# Patient Record
Sex: FEMALE | Race: WHITE | NOT HISPANIC OR LATINO | Employment: OTHER | URBAN - METROPOLITAN AREA
[De-identification: names, ages, dates, MRNs, and addresses within clinical notes are randomized per-mention and may not be internally consistent; named-entity substitution may affect disease eponyms.]

---

## 2017-02-03 LAB
HBV SURFACE AG SERPL QL IA: NONREACTIVE
HIV 1+2 AB+HIV1 P24 AG SERPL QL IA: NONREACTIVE
RUBELLA ABY IGG: NORMAL
T PALLIDUM IGG SER QL: NORMAL

## 2017-08-11 LAB — GROUP B STREP PCR: NEGATIVE

## 2017-09-01 ENCOUNTER — HOSPITAL ENCOUNTER (OUTPATIENT)
Facility: CLINIC | Age: 27
Discharge: HOME | End: 2017-09-01
Attending: OBSTETRICS & GYNECOLOGY | Admitting: OBSTETRICS & GYNECOLOGY
Payer: COMMERCIAL

## 2017-09-01 VITALS
BODY MASS INDEX: 40.97 KG/M2 | HEIGHT: 64 IN | DIASTOLIC BLOOD PRESSURE: 56 MMHG | SYSTOLIC BLOOD PRESSURE: 108 MMHG | WEIGHT: 240 LBS | TEMPERATURE: 98.1 F | RESPIRATION RATE: 18 BRPM

## 2017-09-01 LAB
ALBUMIN UR-MCNC: NEGATIVE MG/DL
APPEARANCE UR: ABNORMAL
BACTERIA #/AREA URNS HPF: ABNORMAL /HPF
BILIRUB UR QL STRIP: NEGATIVE
COLOR UR AUTO: YELLOW
GLUCOSE UR STRIP-MCNC: NEGATIVE MG/DL
HGB UR QL STRIP: NEGATIVE
HYALINE CASTS #/AREA URNS LPF: 1 /LPF (ref 0–2)
KETONES UR STRIP-MCNC: NEGATIVE MG/DL
LEUKOCYTE ESTERASE UR QL STRIP: ABNORMAL
MUCOUS THREADS #/AREA URNS LPF: PRESENT /LPF
NITRATE UR QL: NEGATIVE
PH UR STRIP: 6 PH (ref 5–7)
RBC #/AREA URNS AUTO: 1 /HPF (ref 0–2)
SOURCE: ABNORMAL
SP GR UR STRIP: 1.02 (ref 1–1.03)
SQUAMOUS #/AREA URNS AUTO: 2 /HPF (ref 0–1)
UROBILINOGEN UR STRIP-MCNC: 4 MG/DL (ref 0–2)
WBC #/AREA URNS AUTO: 5 /HPF (ref 0–2)

## 2017-09-01 PROCEDURE — 81001 URINALYSIS AUTO W/SCOPE: CPT | Performed by: OBSTETRICS & GYNECOLOGY

## 2017-09-01 PROCEDURE — 59025 FETAL NON-STRESS TEST: CPT

## 2017-09-01 PROCEDURE — 99214 OFFICE O/P EST MOD 30 MIN: CPT | Mod: 25

## 2017-09-01 RX ORDER — ONDANSETRON 2 MG/ML
4 INJECTION INTRAMUSCULAR; INTRAVENOUS EVERY 6 HOURS PRN
Status: DISCONTINUED | OUTPATIENT
Start: 2017-09-01 | End: 2017-09-01 | Stop reason: HOSPADM

## 2017-09-01 NOTE — PROVIDER NOTIFICATION
"   09/01/17 1522   Provider Notification   Provider Name/Title    Method of Notification Phone   Request Evaluate - Remote   Notification Reason Labor Status;Status Update;SVE     Dr. Camacho updated on no cervical change noted since last exam, contractions spaced apart and patient reports these contractions are \"more tolerable\" to deal with than earlier. Orders for discharge received and give patient prescription for Macrobid 100 mg PO BID x 7 days and one dose of Vistaril 50 mg PO.  "

## 2017-09-01 NOTE — PROVIDER NOTIFICATION
09/01/17 1330   Provider Notification   Provider Name/Title Dr. Camacho   Method of Notification In Department   Notification Reason Status Update     Dr. Camacho reviewed UA results and made aware of current SVE. Orders to have patient ambulate again and recheck in an hour.

## 2017-09-01 NOTE — PROVIDER NOTIFICATION
09/01/17 1214   Provider Notification   Provider Name/Title    Method of Notification In Department   Request Evaluate - Remote   Notification Reason Patient Arrived;Membrane Status;Labor Status;SVE     Dr. Camacho notified of patient's arrival and current status, awaiting results from UA.

## 2017-09-01 NOTE — IP AVS SNAPSHOT
[facility] Labor and Delivery    [address]    [facility] [address]    Phone:  [phone]    Fax:  [phone]                                       After Visit Summary   9/1/2017    Rocío Castle    MRN: [id number]           After Visit Summary Signature Page     I have received my discharge instructions, and my questions have been answered. I have discussed any challenges I see with this plan with the nurse or doctor.    ..........................................................................................................................................  Patient/Patient Representative Signature      ..........................................................................................................................................  Patient Representative Print Name and Relationship to Patient    ..................................................               ................................................  Date                                            Time    ..........................................................................................................................................  Reviewed by Signature/Title    ...................................................              ..............................................  Date                                                            Time

## 2017-09-01 NOTE — IP AVS SNAPSHOT
MRN:[id number]                      After Visit Summary   9/1/2017    Rocío Castle    MRN: [id number]           Thank you!     Thank you for choosing [facility] for your care. Our goal is always to provide you with excellent care. Hearing back from our patients is one way we can continue to improve our services. Please take a few minutes to complete the written survey that you may receive in the mail after you visit. If you would like to speak to someone directly about your visit please contact Patient Relations at [phone]. Thank you!          Patient Information     Date Of Birth          1990        About your hospital stay     You were admitted on:  September 1, 2017 You last received care in the:  [facility] Labor and Delivery    You were discharged on:  September 1, 2017       Who to Call     For medical emergencies, please call 911.  For non-urgent questions about your medical care, please call your primary care provider or clinic, [phone]          Attending Provider     Provider Specialty    Nuha Camacho, DO OB/Gyn       Primary Care Provider Office Phone # Fax #    Marleen Pop [phone]      Further instructions from your care team       Discharge Instruction for Undelivered Patients      You were seen for: Labor Assessment and aware of concentrated urine  We Consulted: Dr. Camacho  You had (Test or Medicine):External fetal monitoring, non-stress test which was noted to be reactive,     Diet:   Regular Diet     Activity:  {Activity:7025384} No restrictions    Call your provider if you notice:  Swelling in your face or increased swelling in your hands or legs.  Headaches that are not relieved by Tylenol (acetaminophen).  Changes in your vision (blurring: seeing spots or stars.)  Nausea (sick to your stomach) and vomiting (throwing up).   Weight gain of 5 pounds or more per week.  Heartburn that doesn't go away.  Signs of  "bladder infection: pain when you urinate (use the toilet), need to go more often and more urgently.  The bag of ravi (rupture of membranes) breaks, or you notice leaking in your underwear.  Bright red blood in your underwear.  Abdominal (lower belly) or stomach pain.  For first baby: Contractions (tightening) less than 5 minutes apart for one hour or more.  Second (plus) baby: Contractions (tightening) less than 10 minutes apart and getting stronger.  *If less than 34 weeks: Contractions (tightenings) more than 6 times in one hour.  Increase or change in vaginal discharge (note the color and amount)  Other: ***    Follow-up:  As scheduled in the clinic          Pending Results     No orders found from 2017 to 2017.            Admission Information     Date & Time Provider Department Dept. Phone    2017 Nuha Camacho,  [facility] Labor and Delivery [phone]      Your Vitals Were     Blood Pressure Temperature Respirations Height Weight BMI (Body Mass Index)    115/74 98.1  F (36.7  C) (Oral) 18 1.626 m (5' 4\") 108.9 kg (240 lb) 41.2 kg/m2      MyChart Information     Dotflux lets you send messages to your doctor, view your test results, renew your prescriptions, schedule appointments and more. To sign up, go to [email or web address] . Click on \"Log in\" on the left side of the screen, which will take you to the Welcome page. Then click on \"Sign up Now\" on the right side of the page.     You will be asked to enter the access code listed below, as well as some personal information. Please follow the directions to create your username and password.     Your access code is: [email or web address]  Expires: 2017  3:33 PM     Your access code will  in 90 days. If you need help or a new code, please call your [place] clinic or [phone].        Care EveryWhere ID     This is your Care EveryWhere ID. This could be used by other organizations to access your [place] medical " records  [id number]        Equal Access to Services     [facility] CenterCARLOS : Hadii amanda galloway lakisha Soalexaali, waaxda luqadaha, qaybta kabhargavi eduardomeryrenee, waxminal katie donyarosina owendiamondisabel vivas. So [facility] [phone].    ATENCIÓN: Si habla español, tiene a grider disposición servicios gratuitos de asistencia lingüística. Llame al [phone].    We comply with applicable federal civil rights laws and [place] laws. We do not discriminate on the basis of race, color, national origin, age, disability sex, sexual orientation or gender identity.               Review of your medicines      UNREVIEWED medicines. Ask your doctor about these medicines        Dose / Directions    aspirin 81 MG chewable tablet        Dose:  81 mg   Take 81 mg by mouth daily   Refills:  0       CELEXA PO   Indication:  Aggressive Behavior        Dose:  40 mg   Take 40 mg by mouth   Refills:  0       folic acid-vit B6-vit B12 0.8-10-0.115 MG Tabs per tablet   Commonly known as:  FOLGARD        Take by mouth daily   Refills:  0       ibuprofen 600 MG tablet   Commonly known as:  ADVIL/MOTRIN   Used for:  Vaginal delivery        Dose:  600 mg   Take 1 tablet (600 mg) by mouth every 6 hours as needed for other (cramping)   Quantity:  30 tablet   Refills:  0       prenatal multivitamin plus iron 27-0.8 MG Tabs per tablet        Dose:  1 tablet   Take 1 tablet by mouth daily   Refills:  0                Protect others around you: Learn how to safely use, store and throw away your medicines at www.disposemymeds.org.             Medication List: This is a list of all your medications and when to take them. Check marks below indicate your daily home schedule. Keep this list as a reference.      Medications           Morning Afternoon Evening Bedtime As Needed    aspirin 81 MG chewable tablet   Take 81 mg by mouth daily                                CELEXA PO   Take 40 mg by mouth                                folic acid-vit B6-vit B12 0.8-10-0.115 MG Tabs  per tablet   Commonly known as:  FOLGARD   Take by mouth daily                                ibuprofen 600 MG tablet   Commonly known as:  ADVIL/MOTRIN   Take 1 tablet (600 mg) by mouth every 6 hours as needed for other (cramping)                                prenatal multivitamin plus iron 27-0.8 MG Tabs per tablet   Take 1 tablet by mouth daily

## 2017-09-01 NOTE — DISCHARGE INSTRUCTIONS
Discharge Instruction for Undelivered Patients      You were seen for: Labor Assessment and aware of concentrated urine  We Consulted: Dr. Camacho  You had (Test or Medicine):External fetal monitoring, non-stress test which was noted to be reactive,     Diet:   Regular Diet     Activity:  {Activity:8184277} No restrictions    Call your provider if you notice:  Swelling in your face or increased swelling in your hands or legs.  Headaches that are not relieved by Tylenol (acetaminophen).  Changes in your vision (blurring: seeing spots or stars.)  Nausea (sick to your stomach) and vomiting (throwing up).   Weight gain of 5 pounds or more per week.  Heartburn that doesn't go away.  Signs of bladder infection: pain when you urinate (use the toilet), need to go more often and more urgently.  The bag of ravi (rupture of membranes) breaks, or you notice leaking in your underwear.  Bright red blood in your underwear.  Abdominal (lower belly) or stomach pain.  For first baby: Contractions (tightening) less than 5 minutes apart for one hour or more.  Second (plus) baby: Contractions (tightening) less than 10 minutes apart and getting stronger.  *If less than 34 weeks: Contractions (tightenings) more than 6 times in one hour.  Increase or change in vaginal discharge (note the color and amount)  Other: ***    Follow-up:  As scheduled in the clinic

## 2017-09-01 NOTE — PROGRESS NOTES
"25 y/o  38 6/ presents to r/o labor and c/o's of pelvic pressure and voiding small amounts.  +FM, no lof, vb.    /74  Temp 98.1  F (36.7  C) (Oral)  Resp 18  Ht 1.626 m (5' 4\")  Wt 108.9 kg (240 lb)  BMI 41.2 kg/m2  SVE per RN /-3, posterior and ballotable  FHT's 130's, moderate variability, reactive, category 1  TOCO irritable    UA RESULTS:  Recent Labs   Lab Test  17   1140   COLOR  Yellow   APPEARANCE  Cloudy   URINEGLC  Negative   URINEBILI  Negative   URINEKETONE  Negative   SG  1.025   UBLD  Negative   URINEPH  6.0   PROTEIN  Negative   NITRITE  Negative   LEUKEST  Moderate*   RBCU  1   WBCU  5*     A/P IUP 38 6/7 R/o labor, possible UTI  1.  No significant SVE change in 3+ hours, baby posterior and ballotable.  Return precautions were reviewed by the RN with the pt and pt will call back with any changes/ worsening of sx's.  50 mg of Vistaril given to help with discomfort if needed.  2.  Empirically treat with Macrobid 100 mg BID for 7 days and await UC.  Increased PO hydration.  3.  Return with changes or RTC for next OB visit.    "

## 2017-09-01 NOTE — PLAN OF CARE
Reviewed discharge instructions with patient, verbalized understanding of these instructions to notify MD if contractions get stronger, rupture of membranes or any other concerns she may have. Instructed patient to contact office to reschedule today's appointment as she was in triage at her scheduled visit, agrees to do so. Patient given  Prescriptions for Macrobid 100 mg BID x 7 days and Vistaril 50 mg PO x 1. Patient home undelivered, accompanied with boyfriend Hakeem.

## 2017-09-01 NOTE — PLAN OF CARE
presents to L&D triage area for assessment to rule out labor and patient reporting her urine was very concentrated this morning. Denies any pain when urinating but aware of increased frequency. Denies LOF or vaginal bleeding. Contractions were regular for 3 hours this morning but now have spaced out. Nursing data base completed. External fetal monitor applied. Clean catch UA obtained and sent to lab. Will contact  in regard to patient's arrival.

## 2017-09-02 ENCOUNTER — ANESTHESIA (OUTPATIENT)
Dept: OBGYN | Facility: CLINIC | Age: 27
End: 2017-09-02
Payer: COMMERCIAL

## 2017-09-02 ENCOUNTER — HOSPITAL ENCOUNTER (INPATIENT)
Facility: CLINIC | Age: 27
LOS: 2 days | Discharge: HOME | End: 2017-09-04
Attending: OBSTETRICS & GYNECOLOGY | Admitting: OBSTETRICS & GYNECOLOGY
Payer: COMMERCIAL

## 2017-09-02 ENCOUNTER — ANESTHESIA EVENT (OUTPATIENT)
Dept: OBGYN | Facility: CLINIC | Age: 27
End: 2017-09-02
Payer: COMMERCIAL

## 2017-09-02 DIAGNOSIS — O40.9XX0: ICD-10-CM

## 2017-09-02 PROBLEM — O99.843 PREVIOUS BARIATRIC SURGERY COMPLICATING PREGNANCY, THIRD TRIMESTER: Status: ACTIVE | Noted: 2017-09-02

## 2017-09-02 PROBLEM — O99.019 IRON DEFICIENCY ANEMIA DURING PREGNANCY, ANTEPARTUM: Status: RESOLVED | Noted: 2017-09-02 | Resolved: 2017-09-02

## 2017-09-02 PROBLEM — D50.9 IRON DEFICIENCY ANEMIA DURING PREGNANCY, ANTEPARTUM: Status: ACTIVE | Noted: 2017-09-02

## 2017-09-02 PROBLEM — D50.9 IRON DEFICIENCY ANEMIA DURING PREGNANCY, ANTEPARTUM: Status: RESOLVED | Noted: 2017-09-02 | Resolved: 2017-09-02

## 2017-09-02 PROBLEM — O99.843 PREVIOUS BARIATRIC SURGERY COMPLICATING PREGNANCY, THIRD TRIMESTER: Status: RESOLVED | Noted: 2017-09-02 | Resolved: 2017-09-02

## 2017-09-02 PROBLEM — O99.019 IRON DEFICIENCY ANEMIA DURING PREGNANCY, ANTEPARTUM: Status: ACTIVE | Noted: 2017-09-02

## 2017-09-02 LAB
HGB BLD-MCNC: 11.4 G/DL (ref 11.7–15.7)
T PALLIDUM IGG+IGM SER QL: NEGATIVE

## 2017-09-02 PROCEDURE — 72200001 ZZH LABOR CARE VAGINAL DELIVERY SINGLE

## 2017-09-02 PROCEDURE — 12000029 ZZH R&B OB INTERMEDIATE

## 2017-09-02 PROCEDURE — 25000125 ZZHC RX 250: Performed by: OBSTETRICS & GYNECOLOGY

## 2017-09-02 PROCEDURE — 37000011 ZZH ANESTHESIA WARD SERVICE

## 2017-09-02 PROCEDURE — 00HU33Z INSERTION OF INFUSION DEVICE INTO SPINAL CANAL, PERCUTANEOUS APPROACH: ICD-10-PCS | Performed by: ANESTHESIOLOGY

## 2017-09-02 PROCEDURE — 86905 BLOOD TYPING RBC ANTIGENS: CPT | Performed by: OBSTETRICS & GYNECOLOGY

## 2017-09-02 PROCEDURE — 85018 HEMOGLOBIN: CPT | Performed by: OBSTETRICS & GYNECOLOGY

## 2017-09-02 PROCEDURE — S0020 INJECTION, BUPIVICAINE HYDRO: HCPCS | Performed by: ANESTHESIOLOGY

## 2017-09-02 PROCEDURE — 86922 COMPATIBILITY TEST ANTIGLOB: CPT | Performed by: OBSTETRICS & GYNECOLOGY

## 2017-09-02 PROCEDURE — 25000128 H RX IP 250 OP 636: Performed by: OBSTETRICS & GYNECOLOGY

## 2017-09-02 PROCEDURE — 25000132 ZZH RX MED GY IP 250 OP 250 PS 637: Performed by: OBSTETRICS & GYNECOLOGY

## 2017-09-02 PROCEDURE — 86870 RBC ANTIBODY IDENTIFICATION: CPT | Performed by: OBSTETRICS & GYNECOLOGY

## 2017-09-02 PROCEDURE — 86901 BLOOD TYPING SEROLOGIC RH(D): CPT | Performed by: OBSTETRICS & GYNECOLOGY

## 2017-09-02 PROCEDURE — 86780 TREPONEMA PALLIDUM: CPT | Performed by: OBSTETRICS & GYNECOLOGY

## 2017-09-02 PROCEDURE — 25000125 ZZHC RX 250: Performed by: ANESTHESIOLOGY

## 2017-09-02 PROCEDURE — 86900 BLOOD TYPING SEROLOGIC ABO: CPT | Performed by: OBSTETRICS & GYNECOLOGY

## 2017-09-02 PROCEDURE — 99215 OFFICE O/P EST HI 40 MIN: CPT

## 2017-09-02 PROCEDURE — 10907ZC DRAINAGE OF AMNIOTIC FLUID, THERAPEUTIC FROM PRODUCTS OF CONCEPTION, VIA NATURAL OR ARTIFICIAL OPENING: ICD-10-PCS | Performed by: OBSTETRICS & GYNECOLOGY

## 2017-09-02 PROCEDURE — 40000671 ZZH STATISTIC ANESTHESIA CASE

## 2017-09-02 PROCEDURE — 3E0R3CZ INTRODUCTION OF REGIONAL ANESTHETIC INTO SPINAL CANAL, PERCUTANEOUS APPROACH: ICD-10-PCS | Performed by: ANESTHESIOLOGY

## 2017-09-02 PROCEDURE — 86850 RBC ANTIBODY SCREEN: CPT | Performed by: OBSTETRICS & GYNECOLOGY

## 2017-09-02 RX ORDER — MISOPROSTOL 200 UG/1
400 TABLET ORAL
Status: DISCONTINUED | OUTPATIENT
Start: 2017-09-02 | End: 2017-09-04 | Stop reason: HOSPADM

## 2017-09-02 RX ORDER — OXYTOCIN/0.9 % SODIUM CHLORIDE 30/500 ML
100 PLASTIC BAG, INJECTION (ML) INTRAVENOUS CONTINUOUS
Status: DISCONTINUED | OUTPATIENT
Start: 2017-09-02 | End: 2017-09-04 | Stop reason: HOSPADM

## 2017-09-02 RX ORDER — OXYCODONE AND ACETAMINOPHEN 5; 325 MG/1; MG/1
1 TABLET ORAL
Status: DISCONTINUED | OUTPATIENT
Start: 2017-09-02 | End: 2017-09-02

## 2017-09-02 RX ORDER — IBUPROFEN 400 MG/1
400-800 TABLET, FILM COATED ORAL EVERY 6 HOURS PRN
Status: DISCONTINUED | OUTPATIENT
Start: 2017-09-02 | End: 2017-09-04 | Stop reason: HOSPADM

## 2017-09-02 RX ORDER — OXYTOCIN/0.9 % SODIUM CHLORIDE 30/500 ML
340 PLASTIC BAG, INJECTION (ML) INTRAVENOUS CONTINUOUS PRN
Status: DISCONTINUED | OUTPATIENT
Start: 2017-09-02 | End: 2017-09-04 | Stop reason: HOSPADM

## 2017-09-02 RX ORDER — EPHEDRINE SULFATE 50 MG/ML
5 INJECTION, SOLUTION INTRAMUSCULAR; INTRAVENOUS; SUBCUTANEOUS
Status: DISCONTINUED | OUTPATIENT
Start: 2017-09-02 | End: 2017-09-02

## 2017-09-02 RX ORDER — ONDANSETRON 2 MG/ML
4 INJECTION INTRAMUSCULAR; INTRAVENOUS EVERY 6 HOURS PRN
Status: DISCONTINUED | OUTPATIENT
Start: 2017-09-02 | End: 2017-09-02

## 2017-09-02 RX ORDER — CITALOPRAM HYDROBROMIDE 20 MG/1
40 TABLET ORAL DAILY
Status: DISCONTINUED | OUTPATIENT
Start: 2017-09-02 | End: 2017-09-04 | Stop reason: HOSPADM

## 2017-09-02 RX ORDER — BUPIVACAINE HYDROCHLORIDE 2.5 MG/ML
INJECTION, SOLUTION INFILTRATION; PERINEURAL PRN
Status: DISCONTINUED | OUTPATIENT
Start: 2017-09-02 | End: 2017-09-02

## 2017-09-02 RX ORDER — SODIUM CHLORIDE, SODIUM LACTATE, POTASSIUM CHLORIDE, CALCIUM CHLORIDE 600; 310; 30; 20 MG/100ML; MG/100ML; MG/100ML; MG/100ML
INJECTION, SOLUTION INTRAVENOUS CONTINUOUS
Status: DISCONTINUED | OUTPATIENT
Start: 2017-09-02 | End: 2017-09-02

## 2017-09-02 RX ORDER — EPHEDRINE SULFATE 50 MG/ML
INJECTION, SOLUTION INTRAMUSCULAR; INTRAVENOUS; SUBCUTANEOUS
Status: DISCONTINUED
Start: 2017-09-02 | End: 2017-09-02 | Stop reason: WASHOUT

## 2017-09-02 RX ORDER — METHYLERGONOVINE MALEATE 0.2 MG/ML
200 INJECTION INTRAVENOUS
Status: DISCONTINUED | OUTPATIENT
Start: 2017-09-02 | End: 2017-09-02

## 2017-09-02 RX ORDER — IBUPROFEN 800 MG/1
800 TABLET, FILM COATED ORAL
Status: DISCONTINUED | OUTPATIENT
Start: 2017-09-02 | End: 2017-09-02

## 2017-09-02 RX ORDER — AMOXICILLIN 250 MG
1-2 CAPSULE ORAL 2 TIMES DAILY
Status: DISCONTINUED | OUTPATIENT
Start: 2017-09-02 | End: 2017-09-04 | Stop reason: HOSPADM

## 2017-09-02 RX ORDER — OXYTOCIN 10 [USP'U]/ML
10 INJECTION, SOLUTION INTRAMUSCULAR; INTRAVENOUS
Status: DISCONTINUED | OUTPATIENT
Start: 2017-09-02 | End: 2017-09-02

## 2017-09-02 RX ORDER — NALOXONE HYDROCHLORIDE 0.4 MG/ML
.1-.4 INJECTION, SOLUTION INTRAMUSCULAR; INTRAVENOUS; SUBCUTANEOUS
Status: DISCONTINUED | OUTPATIENT
Start: 2017-09-02 | End: 2017-09-02

## 2017-09-02 RX ORDER — OXYTOCIN 10 [USP'U]/ML
10 INJECTION, SOLUTION INTRAMUSCULAR; INTRAVENOUS
Status: DISCONTINUED | OUTPATIENT
Start: 2017-09-02 | End: 2017-09-04 | Stop reason: HOSPADM

## 2017-09-02 RX ORDER — BISACODYL 10 MG
10 SUPPOSITORY, RECTAL RECTAL DAILY PRN
Status: DISCONTINUED | OUTPATIENT
Start: 2017-09-04 | End: 2017-09-04 | Stop reason: HOSPADM

## 2017-09-02 RX ORDER — ACETAMINOPHEN 325 MG/1
650 TABLET ORAL EVERY 4 HOURS PRN
Status: DISCONTINUED | OUTPATIENT
Start: 2017-09-02 | End: 2017-09-02

## 2017-09-02 RX ORDER — HYDROCORTISONE 2.5 %
CREAM (GRAM) TOPICAL 3 TIMES DAILY PRN
Status: DISCONTINUED | OUTPATIENT
Start: 2017-09-02 | End: 2017-09-04 | Stop reason: HOSPADM

## 2017-09-02 RX ORDER — ACETAMINOPHEN 325 MG/1
650 TABLET ORAL EVERY 4 HOURS PRN
Status: DISCONTINUED | OUTPATIENT
Start: 2017-09-02 | End: 2017-09-04 | Stop reason: HOSPADM

## 2017-09-02 RX ORDER — LANOLIN 100 %
OINTMENT (GRAM) TOPICAL
Status: DISCONTINUED | OUTPATIENT
Start: 2017-09-02 | End: 2017-09-04 | Stop reason: HOSPADM

## 2017-09-02 RX ORDER — NALOXONE HYDROCHLORIDE 0.4 MG/ML
.1-.4 INJECTION, SOLUTION INTRAMUSCULAR; INTRAVENOUS; SUBCUTANEOUS
Status: DISCONTINUED | OUTPATIENT
Start: 2017-09-02 | End: 2017-09-04 | Stop reason: HOSPADM

## 2017-09-02 RX ORDER — CARBOPROST TROMETHAMINE 250 UG/ML
250 INJECTION, SOLUTION INTRAMUSCULAR
Status: DISCONTINUED | OUTPATIENT
Start: 2017-09-02 | End: 2017-09-02

## 2017-09-02 RX ORDER — NALBUPHINE HYDROCHLORIDE 10 MG/ML
2.5-5 INJECTION, SOLUTION INTRAMUSCULAR; INTRAVENOUS; SUBCUTANEOUS EVERY 6 HOURS PRN
Status: DISCONTINUED | OUTPATIENT
Start: 2017-09-02 | End: 2017-09-02

## 2017-09-02 RX ORDER — FENTANYL CITRATE 50 UG/ML
50-100 INJECTION, SOLUTION INTRAMUSCULAR; INTRAVENOUS
Status: DISCONTINUED | OUTPATIENT
Start: 2017-09-02 | End: 2017-09-02

## 2017-09-02 RX ORDER — OXYTOCIN/0.9 % SODIUM CHLORIDE 30/500 ML
100-340 PLASTIC BAG, INJECTION (ML) INTRAVENOUS CONTINUOUS PRN
Status: COMPLETED | OUTPATIENT
Start: 2017-09-02 | End: 2017-09-02

## 2017-09-02 RX ADMIN — SODIUM CHLORIDE, POTASSIUM CHLORIDE, SODIUM LACTATE AND CALCIUM CHLORIDE 1000 ML: 600; 310; 30; 20 INJECTION, SOLUTION INTRAVENOUS at 06:17

## 2017-09-02 RX ADMIN — IBUPROFEN 800 MG: 400 TABLET ORAL at 13:57

## 2017-09-02 RX ADMIN — ONDANSETRON 4 MG: 2 INJECTION INTRAMUSCULAR; INTRAVENOUS at 06:51

## 2017-09-02 RX ADMIN — SODIUM CHLORIDE, POTASSIUM CHLORIDE, SODIUM LACTATE AND CALCIUM CHLORIDE 1000 ML: 600; 310; 30; 20 INJECTION, SOLUTION INTRAVENOUS at 11:22

## 2017-09-02 RX ADMIN — OXYTOCIN-SODIUM CHLORIDE 0.9% IV SOLN 30 UNIT/500ML 340 ML/HR: 30-0.9/5 SOLUTION at 13:32

## 2017-09-02 RX ADMIN — BUPIVACAINE HYDROCHLORIDE 15 ML: 2.5 INJECTION, SOLUTION INFILTRATION; PERINEURAL at 06:46

## 2017-09-02 RX ADMIN — CITALOPRAM HYDROBROMIDE 40 MG: 20 TABLET ORAL at 21:05

## 2017-09-02 RX ADMIN — ONDANSETRON 4 MG: 2 INJECTION INTRAMUSCULAR; INTRAVENOUS at 11:59

## 2017-09-02 RX ADMIN — SENNOSIDES AND DOCUSATE SODIUM 1 TABLET: 8.6; 5 TABLET ORAL at 20:24

## 2017-09-02 RX ADMIN — SODIUM CHLORIDE, POTASSIUM CHLORIDE, SODIUM LACTATE AND CALCIUM CHLORIDE: 600; 310; 30; 20 INJECTION, SOLUTION INTRAVENOUS at 07:58

## 2017-09-02 RX ADMIN — IBUPROFEN 800 MG: 400 TABLET ORAL at 20:24

## 2017-09-02 NOTE — PROVIDER NOTIFICATION
09/02/17 0640   Provider Notification   Provider Name/Title Dr Muñoz    Method of Notification At Bedside   Request Evaluate in Person   Notification Reason Pain     Dr Muñoz at bedside to place labor epidural

## 2017-09-02 NOTE — PLAN OF CARE
Dr Fletcher here and AROM for a copious amount of clear fluid. Dr Fletcher placed a FSE to continuously monitor FHR. Changed patients bedding, and had house keeping come and mop the floor.

## 2017-09-02 NOTE — IP AVS SNAPSHOT
MRN:[id number]                      After Visit Summary   9/2/2017    Rocío Castle    MRN: [id number]           Thank you!     Thank you for choosing [facility] for your care. Our goal is always to provide you with excellent care. Hearing back from our patients is one way we can continue to improve our services. Please take a few minutes to complete the written survey that you may receive in the mail after you visit. If you would like to speak to someone directly about your visit please contact Patient Relations at [phone]. Thank you!          Patient Information     Date Of Birth          1990        About your hospital stay     You were admitted on:  September 2, 2017 You last received care in the:  [facility] Postpartum    You were discharged on:  September 4, 2017       Who to Call     For medical emergencies, please call 911.  For non-urgent questions about your medical care, please call your primary care provider or clinic, [phone]          Attending Provider     Provider Specialty    Nuha aCmacho DO OB/Gyn    Shiv Fletcher MD OB/Gyn       Primary Care Provider Office Phone # Fax #    Marleen Pop [phone]      After Care Instructions     Activity       Review discharge instructions            Diet       Resume previous diet            Discharge Instructions - Postpartum visit       Schedule postpartum visit with your OB provider and return to clinic in 6 weeks.                  Further instructions from your care team       Postpartum Vaginal Delivery Instructions    Follow up in clinic in 6 weeks      Activity       Ask family and friends for help when you need it.    Do not place anything in your vagina for 6 weeks.    You are not restricted on other activities, but take it easy for a few weeks to allow your body to recover from delivery.  You are able to do any activities you feel up to that point.    No driving until  you have stopped taking your pain medications (usually two weeks after delivery).     Call your health care provider if you have any of these symptoms:       Increased pain, swelling, redness, or fluid around your stiches from an episiotomy or perineal tear.    A fever above 100.4 F (38 C) with or without chills when placing a thermometer under your tongue.    You soak a sanitary pad with blood within 1 hour, or you see blood clots larger than a golf ball.    Bleeding that lasts more than 6 weeks.    Vaginal discharge that smells bad.    Severe pain, cramping or tenderness in your lower belly area.    A need to urinate more frequently (use the toilet more often), more urgently (use the toilet very quickly), or it burns when you urinate.    Nausea and vomiting.    Redness, swelling or pain around a vein in your leg.    Problems breastfeeding or a red or painful area on your breast.    Chest pain and cough or are gasping for air.    Problems coping with sadness, anxiety, or depression.  If you have any concerns about hurting yourself or the baby, call your provider immediately.     You have questions or concerns after you return home.     Keep your hands clean:  Always wash your hands before touching your perineal area and stitches.  This helps reduce your risk of infection.  If your hands aren't dirty, you may use an alcohol hand-rub to clean your hands. Keep your nails clean and short.        Pending Results     No orders found from 8/31/2017 to 9/3/2017.            Statement of Approval     Ordered          09/04/17 0832  I have reviewed and agree with all the recommendations and orders detailed in this document.  EFFECTIVE NOW     Approved and electronically signed by:  Shiv Fletcher MD             Admission Information     Date & Time Provider Department Dept. Phone    9/2/2017 Shiv Fletcher MD [facility] Postpartum [phone]      Your Vitals Were     Blood Pressure Pulse Temperature Respirations  "Height Weight    139/72 69 97.8  F (36.6  C) (Oral) 18 1.626 m (5' 4\") 108.9 kg (240 lb)    Last Period BMI (Body Mass Index)                2016 (Exact Date) 41.2 kg/m2          Entrec[employer] Information     Ent[employer] lets you send messages to your doctor, view your test results, renew your prescriptions, schedule appointments and more. To sign up, go to [email or web address] . Click on \"Log in\" on the left side of the screen, which will take you to the Welcome page. Then click on \"Sign up Now\" on the right side of the page.     You will be asked to enter the access code listed below, as well as some personal information. Please follow the directions to create your username and password.     Your access code is: [email or web address]  Expires: 2017  3:33 PM     Your access code will  in 90 days. If you need help or a new code, please call your [place] clinic or [phone].        Care EveryWhere ID     This is your Care EveryWhere ID. This could be used by other organizations to access your [place] medical records  [id number]        Equal Access to Services     LORENZA JULIO AH: Alison Cool, wamonada lushaadaha, qaybta kaalmada adeegyada, freddy vivas. So [facility] [phone].    ATENCIÓN: Si habla español, tiene a grider disposición servicios gratuitos de asistencia lingüística. Llame al [phone].    We comply with applicable federal civil rights laws and [place] laws. We do not discriminate on the basis of race, color, national origin, age, disability sex, sexual orientation or gender identity.               Review of your medicines      CONTINUE these medicines which may have CHANGED, or have new prescriptions. If we are uncertain of the size of tablets/capsules you have at home, strength may be listed as something that might have changed.        Dose / Directions    ibuprofen 600 MG tablet   Commonly known as:  ADVIL/MOTRIN   This may have changed:    - reasons to " take this  - additional instructions        Dose:  600 mg   Take 1 tablet (600 mg) by mouth every 6 hours as needed for pain Take w/ food   Quantity:  30 tablet   Refills:  0         CONTINUE these medicines which have NOT CHANGED        Dose / Directions    CELEXA PO   Indication:  Aggressive Behavior        Dose:  40 mg   Take 40 mg by mouth   Refills:  0       folic acid-vit B6-vit B12 0.8-10-0.115 MG Tabs per tablet   Commonly known as:  FOLGARD        Take by mouth daily   Refills:  0       prenatal multivitamin plus iron 27-0.8 MG Tabs per tablet        Dose:  1 tablet   Take 1 tablet by mouth daily   Refills:  0         STOP taking     aspirin 81 MG chewable tablet                Where to get your medicines      Some of these will need a paper prescription and others can be bought over the counter. Ask your nurse if you have questions.     Bring a paper prescription for each of these medications     ibuprofen 600 MG tablet                Protect others around you: Learn how to safely use, store and throw away your medicines at www.disposemymeds.org.             Medication List: This is a list of all your medications and when to take them. Check marks below indicate your daily home schedule. Keep this list as a reference.      Medications           Morning Afternoon Evening Bedtime As Needed    CELEXA PO   Take 40 mg by mouth   Last time this was given:  40 mg on 9/4/2017  8:18 AM                                folic acid-vit B6-vit B12 0.8-10-0.115 MG Tabs per tablet   Commonly known as:  FOLGARD   Take by mouth daily                                ibuprofen 600 MG tablet   Commonly known as:  ADVIL/MOTRIN   Take 1 tablet (600 mg) by mouth every 6 hours as needed for pain Take w/ food   Last time this was given:  800 mg on 9/4/2017  4:17 AM                                prenatal multivitamin plus iron 27-0.8 MG Tabs per tablet   Take 1 tablet by mouth daily

## 2017-09-02 NOTE — PLAN OF CARE
Patient arrived in department stating contractions have been getting closer and stronger.  Initial minimal variability but improved to moderate variability on own.  Will plan to have patient walk in hallways after reactive fetal monitor strip.

## 2017-09-02 NOTE — PLAN OF CARE
Data: Patient presented to [facility] at 0415 .   Reason for maternal/fetal assessment per patient is Laboring.  Patient is a . Prenatal record reviewed.      Obstetric History       T1      L1     SAB0   TAB0   Ectopic0   Multiple0   Live Births1       # Outcome Date GA Lbr Edmund/2nd Weight Sex Delivery Anes PTL Lv   4 Current            3 Term 16 40w4d 08:32 / 00:49 3.39 kg (7 lb 7.6 oz) F Vag-Spont EPI,Local N BLNACO      Name: BINDU MARCOS      Apgar1:  8                Apgar5: 9   2 SAB            1 SAB               . Medical history:   Past Medical History:   Diagnosis Date     Anemia     IV Iron infusions     Depressive disorder      Obesity     s/p gastric bypass     Panic attacks    . Gestational Age 39w0d. VSS. Fetal movement present. Patient denies backache, vaginal discharge, pelvic pressure, UTI symptoms, GI problems, bloody show, vaginal bleeding, edema, headache, visual disturbances, epigastric or URQ pain, abdominal pain, rupture of membranes.   Action: Verbal consent for EFM. Triage assessment completed. EFM applied for fetal assessment. Uterine assessment contractions every 2-3 minutes apart moderate in strength. Fetal assessment: Presumed adequate fetal oxygenation documented (see flow record).   Response: Dr. Camacho informed of status. Plan per provider is admit to labor and delivery. Patient verbalized agreement with plan. Patient transferred to room 409 ambulatory, oriented to room and call light. Report given to Dia PINA RN at 0710.

## 2017-09-02 NOTE — PROGRESS NOTES
[facility] L&D Labor Note    Rocío Castle MRN# [id number]   Age: 26 year old YOB: 1990           Subjective:     CTSP for repetitive variable decels.          Objective:   Patient Vitals for the past 8 hrs:   BP Temp Temp src Heart Rate Resp   09/02/17 1146 117/75 - - - 16   09/02/17 1116 113/56 - - 81 16   09/02/17 1019 123/67 - - 68 -   09/02/17 0929 119/63 98.3  F (36.8  C) Oral - 16   09/02/17 0900 124/63 - - - 16   09/02/17 0800 106/60 - - - 16   09/02/17 0750 101/60 97.9  F (36.6  C) Oral - 16   09/02/17 0718 103/59 - - - -   09/02/17 0714 106/60 - - - -   09/02/17 0708 112/61 - - - -   09/02/17 0706 113/63 - - - -   09/02/17 0704 106/60 - - - -   09/02/17 0702 115/70 97.8  F (36.6  C) Oral - 16   09/02/17 0700 112/61 - - - -   09/02/17 0658 117/63 - - - -   09/02/17 0656 114/58 - - - -        Cervical Exam: 10 / 100 / +3/ROP    Membranes: Ruptured      Fetal Heart Rate:    Monitor: FSE   Variability: Moderate    Baseline Rate: (P) 110 bpm    Fetal Heart Rate Tracing: Tier 2 (indeterminate) initially with severe variable decels to 70's with UC's.    Attempt at manual rotation of Vtx was unsuccessful.  Vtx remained in ROP position.  After informed consent, operative vaginal delivery was offered for fetal indications and Pt agreed.  Kiwi vacuum was applied to flexion point.  NICU staff present.  With next 4 UC's, 3 pushes/pulls only resulted in minimal descent likely due to ROP position combined with maternal ineffective expulsive efforts due to epidural inhibiting sensation.  Vtx also would not rotate to KEVON w/ Kiwi either.  However, at this point, FHTs actually were markedly improved.  I offered ceasing attempt at operative vaginal delivery at this point because fetal indication no longer existed.  She would be allowed to passively labor for 30-60 minutes depending on how her sensation develops, with epidural off, with the goal of further descent of the vertex and improved  ability to push.  She agreed.  The Kiwi was removed and Pt was returned to her side.          Assessment:   1)  IUP at 39w0d    2)  Second stage labor - FHTs now reassuring    3)  Persistent ROP position - unable to be rotated manually or with vacuum.    4)  GBS Neg        Plan:   1)  Allow passive labor for 30-60 min, or until sensation returns sufficiently with epidural off to allow more effective pushing, as long as FHTs remain reassuring.      Shiv Fletcher MD

## 2017-09-02 NOTE — PLAN OF CARE
Problem: Goal Outcome Summary  Goal: Goal Outcome Summary  Outcome: Improving  Pt meeting all expected outcomes. Up ad murray and voiding without problems. Using Ibuprofen for mild pain. Bottlefeeding and bonding well with baby.  consult ordered due to hx of anxiety and depression.

## 2017-09-02 NOTE — ADDENDUM NOTE
Addendum  created 09/02/17 1803 by Alfie Mclaughlin MD    Anesthesia Event edited, Procedure Event Log accessed

## 2017-09-02 NOTE — H&P
No significant change in general health status based on examination of the patient, review of Nursing Admission Database and prenatal record.    Shiv Fletcher MD

## 2017-09-02 NOTE — PROVIDER NOTIFICATION
09/02/17 0544   Provider Notification   Provider Name/Title dr phillips   Method of Notification Phone   Request Evaluate - Remote   Notification Reason Patient Arrived;SVE;Status Update     Dr Phillips notified of arrival, SVE and category 1 tracing.  Plan to admit to labor and delivery.  Will assist patient to receive epidural and observe labor.

## 2017-09-02 NOTE — ANESTHESIA PROCEDURE NOTES
Peripheral nerve/Neuraxial procedure note : epidural catheter  Pre-Procedure  Performed by DAVID TORREZ  Referred by CRISTI  Location: OB      Pre-Anesthestic Checklist: patient identified, IV checked, site marked, risks and benefits discussed, informed consent, monitors and equipment checked, pre-op evaluation, at physician/surgeon's request and post-op pain management    Timeout  Correct Patient: Yes   Correct Procedure: Yes   Correct Site: Yes   Correct Laterality: Yes   Correct Position: Yes   Site Marked: Yes   .   Procedure Documentation    .    Procedure:    Epidural catheter.  Insertion Site:L3-4 (difficult to assess due to pt body habitus)  (midline approach) Injection technique: LORT saline   Local skin infiltrated with 1 mL of 1% lidocaine.       Patient Prep;povidone-iodine 7.5% surgical scrub.  .  Needle: Touhy needle Needle Gauge: 17.    Needle Length (Inches) 3.5  # of attempts: 2 and # of redirects:  .   Catheter: 19 G . .  Catheter threaded easily  .  .   .    Assessment/Narrative  Paresthesias: No.  .  .  Aspiration negative for heme or CSF  . Test dose of 3 mL at. Test dose negative for signs of intravascular, subdural or intrathecal injection.

## 2017-09-02 NOTE — IP AVS SNAPSHOT
[facility]    [address]    [facility] [address]    Phone:  [phone]    Fax:  [phone]                                       After Visit Summary   9/2/2017    Rocío Castle    MRN: [id number]           After Visit Summary Signature Page     I have received my discharge instructions, and my questions have been answered. I have discussed any challenges I see with this plan with the nurse or doctor.    ..........................................................................................................................................  Patient/Patient Representative Signature      ..........................................................................................................................................  Patient Representative Print Name and Relationship to Patient    ..................................................               ................................................  Date                                            Time    ..........................................................................................................................................  Reviewed by Signature/Title    ...................................................              ..............................................  Date                                                            Time

## 2017-09-02 NOTE — ANESTHESIA PREPROCEDURE EVALUATION
PAC NOTE:       ANESTHESIA PRE EVALUATION:  Anesthesia Evaluation       history and physical reviewed .      No history of anesthetic complications          ROS/MED HX    ENT/Pulmonary:  - neg pulmonary ROS     Neurologic:  - neg neurologic ROS     Cardiovascular:  - neg cardiovascular ROS       METS/Exercise Tolerance:     Hematologic:         Musculoskeletal:         GI/Hepatic:  - neg GI/hepatic ROS       Renal/Genitourinary:         Endo:         Psychiatric:         Infectious Disease:         Malignancy:         Other:                     Physical Exam  Normal systems: cardiovascular, pulmonary and dental    Airway   Mallampati: II  TM distance: > 3 FB  Neck ROM: full  Mouth opening: > 3 cm    Dental     Cardiovascular       Pulmonary     Other findings: Lab Test        08/28/16     05/14/16     01/27/16     01/27/16     08/03/15                       0847          0828          2005 1938          1225          WBC           --          9.2           --          10.1         5.2           HGB          10.4*        11.0*         --          10.0*        11.2*         MCV           --          87            --          79           84            PLT           --          332           --          379          318           INR           --           --          0.99          --           --            Lab Test        05/14/16     01/27/16     08/03/15                       0828          1938          1225          NA           140          139          139           POTASSIUM    3.8          3.5          3.9           CHLORIDE     108          106          106           CO2          25           26           26            BUN          7            12           9             CR           0.49*        0.52         0.50*         ANIONGAP     7            7            7             TYRELL          8.1*         8.4*         7.9*          GLC          96           73           82                              Anxiety /depression  Class 3 obesity      Anesthesia Plan      History & Physical Review      ASA Status:  .  OB Epidural Asa: 3       Plan for     Anticipate difficulty with placement related to pt body habitus.        Postoperative Care      Consents  Anesthetic plan, risks, benefits and alternatives discussed with:  Patient..                            .

## 2017-09-02 NOTE — ADDENDUM NOTE
Addendum  created 09/02/17 1317 by Alfie Mclaughlin MD    Anesthesia Event edited, Procedure Event Log accessed

## 2017-09-02 NOTE — L&D DELIVERY NOTE
Rocío Castle is a 26-year-old woman,  4, para 1-0-2-1 who presented with spontaneous labor at 39 weeks 0 days.  Her prenatal care was with [facility] in [place] significant for history of gastric bypass surgery.  Of note her most significant thing was that she had to defer on the O'Mcginnis test because of the risk of large sugar load causing dumping syndrome so she only had A1c level drawn at 28 weeks that came back 5.1%.  When she presented to Labor and Delivery, her cervix was 4 cm dilated, 90% effaced, -2 station and vertex presentation.  The patient had artificial rupture of membranes performed at 8:28 a.m. on 2017 with copious amounts of clear amniotic fluid noted.  The amniotic fluid continued to be quite profuse consistent with undiagnosed of polyhydramnios.  The patient did receive a labor epidural with good results.  The patient progressed through active phase labor and reached second stage labor at 11:55 a.m. on 2017 with a cervix that was completely effaced, completely dilated and +3 station but the vertex was in the right occiput posterior position.  Despite numerous manual attempts, I was unable to rotate the vertex to right occiput anterior position.  The patient continued to labor with some pushing initially due to some severe variable decelerations that were noted.  Previous to this during labor, her tracing was category 2 with occasional mild variable decelerations, however, during this initial part of pushing, there were some severe variable decelerations initially.  Therefore after the first several minutes of pushing with the vertex at +3 station, I offered a trial of vacuum-assisted vaginal delivery to facilitate delivery more quickly given the fetal status.  The patient agreed because she was unable to generate very good expulsive efforts even with the epidural initially turned off.  It was possibly due to the block being a little more dense.  Initial attempt after  application of the Kiwi vacuum to the flexion point, after draining of the bladder, she tried pushing over the next 4 contractions with 3 pushes/pulls with each contraction resulting only minimal descent due to the right occiput posterior position.  Also combined with the fact that she was un- able to push effectively due to her epidural and thus we decided to stop trying.  I had also tried to see that the vertex would rotate with the Kiwi and I was unsuccessful to do so.  However at this point it was noted that the fetal heart rate tracing actually looked better than it had during most of the second stage at this point.  The severe variable decelerations had resolved and were mostly just early decelerations.  I gave her the option of proceeding with passive labor for the next 30 to 60 minutes and with the epidural off hopefully she would have increased sensation and better ability to push.  She agreed to do so.  The Kiwi was removed and the patient was turned to her side for the next 30 minutes.  At that point 30 minutes later I was called back to the bedside because the patient was having worsening pain and pressure in her pelvis.  We put her back in lithotomy position and the vertex was at about +4 station and she reattempted pushing.  However she was unable to generate enough pushing force because she was experiencing too much low back pain most likely related to the occiput posterior position of the vertex.  The anesthesiologist redosed the epidural and after a few minutes we resumed attempting to push although I did give her the option to try to do a trial of forceps because initial attempts at pushing unsuccessful.  She understood the indication for forceps assisted vaginal delivery and gave informed consent.  NICU staff were present for this as well.        After the bladder was straight catheterized again a low forceps application of Luikart-Sapp forceps to the vertex was performed after confirming the  vertex was still in the right occiput posterior position.  The vertex did not initially want to rotate and thus with the next 5 contractions over the next 160 seconds with 3 pulls/pushes with each contraction the vertex was able to be brought down to the perineum and delivered atraumatically over a median episiotomy.  Delivery occurred on 2017 at 1:28PM.  The term male infant delivered in the right occiput posterior position and then quickly rotated externally to the right occiput anterior position.  The oropharynx and nares were bulb suctioned on the perineum.  There was no nuchal cord.  Rest of delivery occurred atraumatically without shoulder dystocia and the infant was initially slightly sluggish thought due to the Celexa the patient had taken antenatally but otherwise appeared to be breathing without difficulty and had a normal heart rate throughout.  The term live male infant weighed 3840 grams (8 pounds 7 ounces) with Apgars of 6 at 1 minute, 7 at 5 minutes and 9 at 10 minutes.  After delivery of the infant delayed cord clamping was done and then the 3-vessel cord was doubly clamped and cut and the infant was handed off to the  nurse practitioner who was in attendance the entire time.  The cord blood was obtained.  Placenta was delivered spontaneously with gentle cord traction and noted to be normal and intact.  The median episiotomy was repaired using 3-0 Vicryl in the usual running manner.  The rest of the vagina was also examined closely and confirmed to have no other lacerations.  The patient tolerated the operative delivery well.  She will get routine postpartum care.         CELESTE NICHOLAS MD             D: 2017 14:06   T: 2017 18:55   MT: [id number]      Name:     ASUNCION MARCOS   MRN:      [id number]        Account:        [id number]   :      1990           Delivery Date:  2017      Document: [id number]

## 2017-09-03 LAB
ABO + RH BLD: ABNORMAL
ABO + RH BLD: ABNORMAL
BLD GP AB INVEST PLASRBC-IMP: ABNORMAL
BLD GP AB SCN SERPL QL: ABNORMAL
BLD PROD TYP BPU: ABNORMAL
BLOOD BANK CMNT PATIENT-IMP: ABNORMAL
NUM BPU REQUESTED: 2
SPECIMEN EXP DATE BLD: ABNORMAL

## 2017-09-03 PROCEDURE — 12000029 ZZH R&B OB INTERMEDIATE

## 2017-09-03 PROCEDURE — 25000132 ZZH RX MED GY IP 250 OP 250 PS 637: Performed by: OBSTETRICS & GYNECOLOGY

## 2017-09-03 RX ADMIN — IBUPROFEN 800 MG: 400 TABLET ORAL at 06:14

## 2017-09-03 RX ADMIN — IBUPROFEN 800 MG: 400 TABLET ORAL at 20:17

## 2017-09-03 RX ADMIN — IBUPROFEN 800 MG: 400 TABLET ORAL at 14:18

## 2017-09-03 RX ADMIN — ACETAMINOPHEN 650 MG: 325 TABLET, FILM COATED ORAL at 22:21

## 2017-09-03 RX ADMIN — CITALOPRAM HYDROBROMIDE 40 MG: 20 TABLET ORAL at 14:18

## 2017-09-03 RX ADMIN — SENNOSIDES AND DOCUSATE SODIUM 1 TABLET: 8.6; 5 TABLET ORAL at 20:17

## 2017-09-03 RX ADMIN — SENNOSIDES AND DOCUSATE SODIUM 1 TABLET: 8.6; 5 TABLET ORAL at 14:17

## 2017-09-03 NOTE — PLAN OF CARE
Problem: Goal Outcome Summary  Goal: Goal Outcome Summary  Outcome: Improving  Meeting expected outcomes. Up ad murray. Using Ibuprofen for perineal pain with adequate relief. Bottlefeeding.

## 2017-09-03 NOTE — PLAN OF CARE
Problem: Goal Outcome Summary  Goal: Goal Outcome Summary  Outcome: Improving  VSS, Bonding well with baby. Pain is well controlled with ibuprofen. Patient is voiding without difficulty and ambulating independently. Tolerating regular diet well. Independent in self and baby cares. Formula feeding is going well. Will continue to monitor.

## 2017-09-03 NOTE — PROGRESS NOTES
"[facility] Postpartum Note    S:  Patient without complaints.  Minimal lochia.    O:  Blood pressure 137/84, pulse 60, temperature 98  F (36.7  C), temperature source Oral, resp. rate 16, height 1.626 m (5' 4\"), weight 108.9 kg (240 lb), last menstrual period 12/03/2016, unknown if currently breastfeeding.        Urine output adequate        Abdomen - Fundus firm, at umbilicus, nontender        Extremities - No calf tenderness    A:   Postpartum Day# 1, s/p FAVD - doing well    P:  1)  Routine care        2)  Breast and formula feeding        3)  Anticipate discharge tomorrow    Nuha Camacho, DO        "

## 2017-09-03 NOTE — ANESTHESIA POSTPROCEDURE EVALUATION
Patient: Rocío Castle    * No procedures listed *    Diagnosis:* No pre-op diagnosis entered *  Diagnosis Additional Information: Labor pain  Morbid obesity    Anesthesia Type:  Epidural    Note:  Anesthesia Post Evaluation         Comments:     S/P epidural for labor.   I or my partner was immediately available for management of this patient during epidural analgesia infusion.  VSS.  Doing well. Block resolved.  Neuro at baseline. Denies positional headache. Minimal side effects easily managed w/ PRN meds. No apparent anesthetic complications. No follow-up required.    Jericho Muñoz MD        Last vitals:  Vitals:    09/02/17 1457 09/02/17 2107 09/03/17 0134   BP: 128/75 135/67 137/84   Pulse:   60   Resp: 16 16 16   Temp:  98.5  F (36.9  C) 98  F (36.7  C)         Electronically Signed By: Jericho Muñoz MD  September 3, 2017  8:40 AM

## 2017-09-04 VITALS
HEART RATE: 69 BPM | TEMPERATURE: 97.8 F | WEIGHT: 240 LBS | RESPIRATION RATE: 18 BRPM | HEIGHT: 64 IN | SYSTOLIC BLOOD PRESSURE: 139 MMHG | BODY MASS INDEX: 40.97 KG/M2 | DIASTOLIC BLOOD PRESSURE: 72 MMHG

## 2017-09-04 PROCEDURE — 25000132 ZZH RX MED GY IP 250 OP 250 PS 637: Performed by: OBSTETRICS & GYNECOLOGY

## 2017-09-04 RX ORDER — IBUPROFEN 600 MG/1
600 TABLET, FILM COATED ORAL EVERY 6 HOURS PRN
Qty: 30 TABLET | Refills: 0 | Status: SHIPPED | OUTPATIENT
Start: 2017-09-04 | End: 2017-12-29

## 2017-09-04 RX ADMIN — IBUPROFEN 800 MG: 400 TABLET ORAL at 04:17

## 2017-09-04 RX ADMIN — CITALOPRAM HYDROBROMIDE 40 MG: 20 TABLET ORAL at 08:18

## 2017-09-04 RX ADMIN — ACETAMINOPHEN 650 MG: 325 TABLET, FILM COATED ORAL at 08:18

## 2017-09-04 RX ADMIN — ACETAMINOPHEN 650 MG: 325 TABLET, FILM COATED ORAL at 04:17

## 2017-09-04 RX ADMIN — Medication: at 00:54

## 2017-09-04 RX ADMIN — SENNOSIDES AND DOCUSATE SODIUM 2 TABLET: 8.6; 5 TABLET ORAL at 08:18

## 2017-09-04 NOTE — DISCHARGE SUMMARY
"[facility] Postpartum/Discharge Note    S:  Patient without complaints.  Minimal lochia.    O:  Blood pressure 139/72, pulse 69, temperature 97.8  F (36.6  C), temperature source Oral, resp. rate 18, height 1.626 m (5' 4\"), weight 108.9 kg (240 lb), last menstrual period 12/03/2016, unknown if currently breastfeeding.        Urine output adequate        Abdomen - Fundus firm, at umbilicus, nontender        Extremities - No calf tenderness    A:   Postpartum Day# 2, s/p Forceps-assisted Vaginal delivery - doing well    P:  1)  Discharge home        2)  F/U 6 weeks w/ Primary OB        3)  Discharge meds: Carrol Fletcher MD          "

## 2017-09-04 NOTE — PLAN OF CARE
Pt discharging to home in stable condition.  Pt knows to follow up in clinic at 4 weeks for her tubal ligation and will make appointment soon.  Pt given medication for home.  All questions answered at this time.

## 2017-09-04 NOTE — PLAN OF CARE
Problem: Goal Outcome Summary  Goal: Goal Outcome Summary  Outcome: Adequate for Discharge Date Met:  09/04/17  AVS discharge instructions reviewed with pt by PEYTON Cordero. Patient verbalized understanding of all discharge instructions and states she has no further questions at this time. All education completed. SW saw pt today - see SW note. Pt discharged to home with infant and FOB with all belongings and accompanied to their vehicle by RN at 1415.

## 2017-09-04 NOTE — PLAN OF CARE
Problem: Goal Outcome Summary  Goal: Goal Outcome Summary  Outcome: Improving  Patient is stable, meeting expected goals. Taking ibuprofen for pain. Independent with  cares.

## 2017-09-04 NOTE — PLAN OF CARE
Problem: Goal Outcome Summary  Goal: Goal Outcome Summary  Outcome: Improving  VSS. Postpartum assessment WDL - see flowsheet. Pt up ad murray and independent with self and infant cares. FOB also assisting with cares at the bedside.Formula feeding infant by bottle, encouraged mom to call if she needs any assistance or has any questions. Pt tolerating a general diet and voiding spontaneously without difficulty. Pain adequately controlled this shift per pt with prn tylenol and ibuprofen. Also states tooth pain has subsided since starting orajel and staying on top of prn medications, declined need for additional intervention. Anticipate DC today. Continue to monitor.

## 2017-09-04 NOTE — DISCHARGE INSTRUCTIONS
Postpartum Vaginal Delivery Instructions    Follow up in clinic in 6 weeks      Activity       Ask family and friends for help when you need it.    Do not place anything in your vagina for 6 weeks.    You are not restricted on other activities, but take it easy for a few weeks to allow your body to recover from delivery.  You are able to do any activities you feel up to that point.    No driving until you have stopped taking your pain medications (usually two weeks after delivery).     Call your health care provider if you have any of these symptoms:       Increased pain, swelling, redness, or fluid around your stiches from an episiotomy or perineal tear.    A fever above 100.4 F (38 C) with or without chills when placing a thermometer under your tongue.    You soak a sanitary pad with blood within 1 hour, or you see blood clots larger than a golf ball.    Bleeding that lasts more than 6 weeks.    Vaginal discharge that smells bad.    Severe pain, cramping or tenderness in your lower belly area.    A need to urinate more frequently (use the toilet more often), more urgently (use the toilet very quickly), or it burns when you urinate.    Nausea and vomiting.    Redness, swelling or pain around a vein in your leg.    Problems breastfeeding or a red or painful area on your breast.    Chest pain and cough or are gasping for air.    Problems coping with sadness, anxiety, or depression.  If you have any concerns about hurting yourself or the baby, call your provider immediately.     You have questions or concerns after you return home.     Keep your hands clean:  Always wash your hands before touching your perineal area and stitches.  This helps reduce your risk of infection.  If your hands aren't dirty, you may use an alcohol hand-rub to clean your hands. Keep your nails clean and short.

## 2017-09-04 NOTE — PLAN OF CARE
Problem: Goal Outcome Summary  Goal: Goal Outcome Summary  Outcome: Improving  VSS, Bonding well with baby. Pain is well controlled with ibuprofen and tylenol. MD notified patient experiencing tooth pain and requested Orajel. Orajel given to patient, provided adequate relief of the dental pain. Patient is voiding without difficulty and ambulating independently. Tolerating regular diet well. Independent in self and baby cares. Formula feeding is going well. Meeting expected outcomes, will continue to monitor.

## 2017-09-06 LAB
BLD PROD TYP BPU: NORMAL
BLD PROD TYP BPU: NORMAL
BLD UNIT ID BPU: 0
BLD UNIT ID BPU: 0
BLOOD PRODUCT CODE: NORMAL
BLOOD PRODUCT CODE: NORMAL
BPU ID: NORMAL
BPU ID: NORMAL
TRANSFUSION STATUS PATIENT QL: NORMAL

## 2017-11-21 ENCOUNTER — ANESTHESIA (OUTPATIENT)
Dept: SURGERY | Facility: CLINIC | Age: 27
End: 2017-11-21
Payer: COMMERCIAL

## 2017-11-21 ENCOUNTER — ANESTHESIA EVENT (OUTPATIENT)
Dept: SURGERY | Facility: CLINIC | Age: 27
End: 2017-11-21
Payer: COMMERCIAL

## 2017-11-21 ENCOUNTER — SURGERY (OUTPATIENT)
Age: 27
End: 2017-11-21

## 2017-11-21 ENCOUNTER — HOSPITAL ENCOUNTER (OUTPATIENT)
Facility: CLINIC | Age: 27
Discharge: HOME | End: 2017-11-21
Attending: OBSTETRICS & GYNECOLOGY | Admitting: OBSTETRICS & GYNECOLOGY
Payer: COMMERCIAL

## 2017-11-21 VITALS
SYSTOLIC BLOOD PRESSURE: 118 MMHG | WEIGHT: 208.3 LBS | DIASTOLIC BLOOD PRESSURE: 75 MMHG | HEIGHT: 65 IN | OXYGEN SATURATION: 98 % | TEMPERATURE: 98 F | RESPIRATION RATE: 18 BRPM | BODY MASS INDEX: 34.7 KG/M2

## 2017-11-21 DIAGNOSIS — Z30.2 ENCOUNTER FOR TUBAL LIGATION: Primary | ICD-10-CM

## 2017-11-21 LAB — HCG UR QL: NEGATIVE

## 2017-11-21 PROCEDURE — 71000013 ZZH RECOVERY PHASE 1 LEVEL 1 EA ADDTL HR: Performed by: OBSTETRICS & GYNECOLOGY

## 2017-11-21 PROCEDURE — 81025 URINE PREGNANCY TEST: CPT | Performed by: ANESTHESIOLOGY

## 2017-11-21 PROCEDURE — 25000128 H RX IP 250 OP 636: Performed by: OBSTETRICS & GYNECOLOGY

## 2017-11-21 PROCEDURE — 25000125 ZZHC RX 250: Performed by: NURSE ANESTHETIST, CERTIFIED REGISTERED

## 2017-11-21 PROCEDURE — 25000128 H RX IP 250 OP 636: Performed by: NURSE ANESTHETIST, CERTIFIED REGISTERED

## 2017-11-21 PROCEDURE — 40000306 ZZH STATISTIC PRE PROC ASSESS II: Performed by: OBSTETRICS & GYNECOLOGY

## 2017-11-21 PROCEDURE — 37000008 ZZH ANESTHESIA TECHNICAL FEE, 1ST 30 MIN: Performed by: OBSTETRICS & GYNECOLOGY

## 2017-11-21 PROCEDURE — 36000050 ZZH SURGERY LEVEL 2 1ST 30 MIN: Performed by: OBSTETRICS & GYNECOLOGY

## 2017-11-21 PROCEDURE — 71000027 ZZH RECOVERY PHASE 2 EACH 15 MINS: Performed by: OBSTETRICS & GYNECOLOGY

## 2017-11-21 PROCEDURE — 25000566 ZZH SEVOFLURANE, EA 15 MIN: Performed by: OBSTETRICS & GYNECOLOGY

## 2017-11-21 PROCEDURE — 25000132 ZZH RX MED GY IP 250 OP 250 PS 637: Performed by: OBSTETRICS & GYNECOLOGY

## 2017-11-21 PROCEDURE — 37000009 ZZH ANESTHESIA TECHNICAL FEE, EACH ADDTL 15 MIN: Performed by: OBSTETRICS & GYNECOLOGY

## 2017-11-21 PROCEDURE — 27210794 ZZH OR GENERAL SUPPLY STERILE: Performed by: OBSTETRICS & GYNECOLOGY

## 2017-11-21 PROCEDURE — 36000052 ZZH SURGERY LEVEL 2 EA 15 ADDTL MIN: Performed by: OBSTETRICS & GYNECOLOGY

## 2017-11-21 PROCEDURE — 25000128 H RX IP 250 OP 636: Performed by: ANESTHESIOLOGY

## 2017-11-21 PROCEDURE — 71000012 ZZH RECOVERY PHASE 1 LEVEL 1 FIRST HR: Performed by: OBSTETRICS & GYNECOLOGY

## 2017-11-21 PROCEDURE — 25000132 ZZH RX MED GY IP 250 OP 250 PS 637: Performed by: ANESTHESIOLOGY

## 2017-11-21 PROCEDURE — 93010 ELECTROCARDIOGRAM REPORT: CPT | Performed by: INTERNAL MEDICINE

## 2017-11-21 RX ORDER — LABETALOL HYDROCHLORIDE 5 MG/ML
10 INJECTION, SOLUTION INTRAVENOUS EVERY 5 MIN PRN
Status: DISCONTINUED | OUTPATIENT
Start: 2017-11-21 | End: 2017-11-21 | Stop reason: HOSPADM

## 2017-11-21 RX ORDER — NALOXONE HYDROCHLORIDE 0.4 MG/ML
.1-.4 INJECTION, SOLUTION INTRAMUSCULAR; INTRAVENOUS; SUBCUTANEOUS
Status: DISCONTINUED | OUTPATIENT
Start: 2017-11-21 | End: 2017-11-21 | Stop reason: HOSPADM

## 2017-11-21 RX ORDER — HYDROMORPHONE HYDROCHLORIDE 1 MG/ML
.3-.5 INJECTION, SOLUTION INTRAMUSCULAR; INTRAVENOUS; SUBCUTANEOUS EVERY 10 MIN PRN
Status: DISCONTINUED | OUTPATIENT
Start: 2017-11-21 | End: 2017-11-21 | Stop reason: HOSPADM

## 2017-11-21 RX ORDER — ONDANSETRON 2 MG/ML
4 INJECTION INTRAMUSCULAR; INTRAVENOUS EVERY 30 MIN PRN
Status: DISCONTINUED | OUTPATIENT
Start: 2017-11-21 | End: 2017-11-21 | Stop reason: HOSPADM

## 2017-11-21 RX ORDER — ONDANSETRON 4 MG/1
4 TABLET, ORALLY DISINTEGRATING ORAL EVERY 30 MIN PRN
Status: DISCONTINUED | OUTPATIENT
Start: 2017-11-21 | End: 2017-11-21 | Stop reason: HOSPADM

## 2017-11-21 RX ORDER — ACETAMINOPHEN 500 MG
1000 TABLET ORAL ONCE
Status: COMPLETED | OUTPATIENT
Start: 2017-11-21 | End: 2017-11-21

## 2017-11-21 RX ORDER — KETOROLAC TROMETHAMINE 30 MG/ML
30 INJECTION, SOLUTION INTRAMUSCULAR; INTRAVENOUS ONCE
Status: COMPLETED | OUTPATIENT
Start: 2017-11-21 | End: 2017-11-21

## 2017-11-21 RX ORDER — OXYCODONE HYDROCHLORIDE 5 MG/1
5 TABLET ORAL
Status: COMPLETED | OUTPATIENT
Start: 2017-11-21 | End: 2017-11-21

## 2017-11-21 RX ORDER — LIDOCAINE 40 MG/G
CREAM TOPICAL
Status: DISCONTINUED | OUTPATIENT
Start: 2017-11-21 | End: 2017-11-21 | Stop reason: HOSPADM

## 2017-11-21 RX ORDER — MEPERIDINE HYDROCHLORIDE 25 MG/ML
12.5 INJECTION INTRAMUSCULAR; INTRAVENOUS; SUBCUTANEOUS
Status: DISCONTINUED | OUTPATIENT
Start: 2017-11-21 | End: 2017-11-21 | Stop reason: HOSPADM

## 2017-11-21 RX ORDER — ONDANSETRON 2 MG/ML
INJECTION INTRAMUSCULAR; INTRAVENOUS PRN
Status: DISCONTINUED | OUTPATIENT
Start: 2017-11-21 | End: 2017-11-21

## 2017-11-21 RX ORDER — SODIUM CHLORIDE, SODIUM LACTATE, POTASSIUM CHLORIDE, CALCIUM CHLORIDE 600; 310; 30; 20 MG/100ML; MG/100ML; MG/100ML; MG/100ML
INJECTION, SOLUTION INTRAVENOUS CONTINUOUS
Status: DISCONTINUED | OUTPATIENT
Start: 2017-11-21 | End: 2017-11-21 | Stop reason: HOSPADM

## 2017-11-21 RX ORDER — LIDOCAINE HYDROCHLORIDE 10 MG/ML
INJECTION, SOLUTION INFILTRATION; PERINEURAL PRN
Status: DISCONTINUED | OUTPATIENT
Start: 2017-11-21 | End: 2017-11-21

## 2017-11-21 RX ORDER — PROPOFOL 10 MG/ML
INJECTION, EMULSION INTRAVENOUS PRN
Status: DISCONTINUED | OUTPATIENT
Start: 2017-11-21 | End: 2017-11-21

## 2017-11-21 RX ORDER — FENTANYL CITRATE 50 UG/ML
INJECTION, SOLUTION INTRAMUSCULAR; INTRAVENOUS PRN
Status: DISCONTINUED | OUTPATIENT
Start: 2017-11-21 | End: 2017-11-21

## 2017-11-21 RX ORDER — GLYCOPYRROLATE 0.2 MG/ML
INJECTION, SOLUTION INTRAMUSCULAR; INTRAVENOUS PRN
Status: DISCONTINUED | OUTPATIENT
Start: 2017-11-21 | End: 2017-11-21

## 2017-11-21 RX ORDER — NEOSTIGMINE METHYLSULFATE 1 MG/ML
VIAL (ML) INJECTION PRN
Status: DISCONTINUED | OUTPATIENT
Start: 2017-11-21 | End: 2017-11-21

## 2017-11-21 RX ORDER — FENTANYL CITRATE 50 UG/ML
25-50 INJECTION, SOLUTION INTRAMUSCULAR; INTRAVENOUS
Status: DISCONTINUED | OUTPATIENT
Start: 2017-11-21 | End: 2017-11-21 | Stop reason: HOSPADM

## 2017-11-21 RX ORDER — ONDANSETRON 4 MG/1
4 TABLET, ORALLY DISINTEGRATING ORAL
Status: DISCONTINUED | OUTPATIENT
Start: 2017-11-21 | End: 2017-11-21 | Stop reason: HOSPADM

## 2017-11-21 RX ORDER — DEXAMETHASONE SODIUM PHOSPHATE 4 MG/ML
INJECTION, SOLUTION INTRA-ARTICULAR; INTRALESIONAL; INTRAMUSCULAR; INTRAVENOUS; SOFT TISSUE PRN
Status: DISCONTINUED | OUTPATIENT
Start: 2017-11-21 | End: 2017-11-21

## 2017-11-21 RX ORDER — OXYCODONE HYDROCHLORIDE 5 MG/1
5-10 TABLET ORAL
Qty: 15 TABLET | Refills: 0 | Status: SHIPPED | OUTPATIENT
Start: 2017-11-21 | End: 2017-12-29

## 2017-11-21 RX ADMIN — ONDANSETRON 4 MG: 2 INJECTION INTRAMUSCULAR; INTRAVENOUS at 13:28

## 2017-11-21 RX ADMIN — MIDAZOLAM HYDROCHLORIDE 2 MG: 1 INJECTION, SOLUTION INTRAMUSCULAR; INTRAVENOUS at 12:51

## 2017-11-21 RX ADMIN — Medication 3 MG: at 13:28

## 2017-11-21 RX ADMIN — ROCURONIUM BROMIDE 40 MG: 10 INJECTION INTRAVENOUS at 12:56

## 2017-11-21 RX ADMIN — FENTANYL CITRATE 50 MCG: 50 INJECTION, SOLUTION INTRAMUSCULAR; INTRAVENOUS at 13:17

## 2017-11-21 RX ADMIN — OXYCODONE HYDROCHLORIDE 5 MG: 5 TABLET ORAL at 14:48

## 2017-11-21 RX ADMIN — DEXAMETHASONE SODIUM PHOSPHATE 4 MG: 4 INJECTION, SOLUTION INTRA-ARTICULAR; INTRALESIONAL; INTRAMUSCULAR; INTRAVENOUS; SOFT TISSUE at 12:56

## 2017-11-21 RX ADMIN — GLYCOPYRROLATE 0.6 MG: 0.2 INJECTION, SOLUTION INTRAMUSCULAR; INTRAVENOUS at 13:28

## 2017-11-21 RX ADMIN — KETOROLAC TROMETHAMINE 30 MG: 30 INJECTION, SOLUTION INTRAMUSCULAR at 12:09

## 2017-11-21 RX ADMIN — PROPOFOL 150 MG: 10 INJECTION, EMULSION INTRAVENOUS at 12:55

## 2017-11-21 RX ADMIN — FENTANYL CITRATE 100 MCG: 50 INJECTION, SOLUTION INTRAMUSCULAR; INTRAVENOUS at 12:55

## 2017-11-21 RX ADMIN — LIDOCAINE HYDROCHLORIDE 50 MG: 10 INJECTION, SOLUTION INFILTRATION; PERINEURAL at 12:55

## 2017-11-21 RX ADMIN — SODIUM CHLORIDE, POTASSIUM CHLORIDE, SODIUM LACTATE AND CALCIUM CHLORIDE: 600; 310; 30; 20 INJECTION, SOLUTION INTRAVENOUS at 12:02

## 2017-11-21 RX ADMIN — FENTANYL CITRATE 50 MCG: 50 INJECTION, SOLUTION INTRAMUSCULAR; INTRAVENOUS at 14:36

## 2017-11-21 RX ADMIN — ACETAMINOPHEN 1000 MG: 500 TABLET, FILM COATED ORAL at 11:20

## 2017-11-21 NOTE — ANESTHESIA PREPROCEDURE EVALUATION
Anesthesia Evaluation     . Pt has had prior anesthetic. Type: General    No history of anesthetic complications          ROS/MED HX    ENT/Pulmonary:  - neg pulmonary ROS     Neurologic:  - neg neurologic ROS     Cardiovascular:  - neg cardiovascular ROS       METS/Exercise Tolerance:     Hematologic:  - neg hematologic  ROS       Musculoskeletal:  - neg musculoskeletal ROS       GI/Hepatic:  - neg GI/hepatic ROS       Renal/Genitourinary:  - ROS Renal section negative       Endo:     (+) Obesity, .      Psychiatric:     (+) psychiatric history anxiety, other (comment) and depression (panic attacks)      Infectious Disease:  - neg infectious disease ROS       Malignancy:      - no malignancy   Other:    - neg other ROS                 Physical Exam  Normal systems: cardiovascular, pulmonary and dental    Airway   Mallampati: II  TM distance: >3 FB  Neck ROM: full    Dental     Cardiovascular       Pulmonary                     Anesthesia Plan      History & Physical Review  History and physical reviewed and following examination; no interval change.    ASA Status:  2 .    NPO Status:  > 8 hours    Plan for General and ETT with Intravenous and Propofol induction. Maintenance will be Balanced.    PONV prophylaxis:  Ondansetron (or other 5HT-3) and Dexamethasone or Solumedrol       Postoperative Care  Postoperative pain management:  IV analgesics and Oral pain medications.      Consents  Anesthetic plan, risks, benefits and alternatives discussed with:  Patient or representative and Patient..                          .

## 2017-11-21 NOTE — ANESTHESIA POSTPROCEDURE EVALUATION
Patient: Rocío Castle    Procedure(s):  LAPAROSCOPIC BILATERAL TUBAL LIGATION - Wound Class: II-Clean Contaminated    Diagnosis:Elcyive Sterilization   Diagnosis Additional Information: Electiive Sterilization, s/p tubal ligation    Anesthesia Type:  General, ETT    Note:  Anesthesia Post Evaluation    Patient location during evaluation: PACU  Patient participation: Able to fully participate in evaluation  Level of consciousness: awake and alert  Pain management: adequate  Airway patency: patent  Cardiovascular status: acceptable  Respiratory status: acceptable  Hydration status: acceptable  PONV: none     Anesthetic complications: None          Last vitals:  Vitals:    11/21/17 1445 11/21/17 1448 11/21/17 1517   BP: 109/81  129/65   Resp: 21 20   Temp:   97.9  F (36.6  C)   SpO2: 97% 98% 99%         Electronically Signed By: Leonardo Gan MD  November 21, 2017  3:46 PM

## 2017-11-21 NOTE — IP AVS SNAPSHOT
MRN:[id number]                      After Visit Summary   11/21/2017    Rocío Castle    MRN: [id number]           Thank you!     Thank you for choosing [facility] for your care. Our goal is always to provide you with excellent care. Hearing back from our patients is one way we can continue to improve our services. Please take a few minutes to complete the written survey that you may receive in the mail after you visit. If you would like to speak to someone directly about your visit please contact Patient Relations at [phone]. Thank you!          Patient Information     Date Of Birth          1990        About your hospital stay     You were admitted on:  November 21, 2017 You last received care in the:  [facility] PreOP/PostOP    You were discharged on:  November 21, 2017       Who to Call     For medical emergencies, please call 911.  For non-urgent questions about your medical care, please call your primary care provider or clinic, [phone]  For questions related to your surgery, please call your surgery clinic        Attending Provider     Provider Specialty    Darren Verdugo MD OB/Gyn       Primary Care Provider Office Phone # Fax #    Marleen Pop [phone]      After Care Instructions     Discharge Instructions       Resume pre procedure diet            Dressing       Keep dressing clean and dry, change as instructed by Provider or RN            No alcohol       NO ALCOHOL for 24 hours post procedure            No driving or operating machinery       No driving or operating machinery until day after procedure            No lifting       No lifting over 25 pounds and no strenuous physical activity.  For 2 weeks            Shower        Shower on Post-op day  2.   DO NOT take a bath                  Further instructions from your care team       Maximum acetaminophen (Tylenol) dose from all sources should not exceed 4 grams  (4000 mg) per day.  You had 1,000mg at 11:20am.  Do not take any until 5:20pm.    You received Toradol, an IV form of ibuprofen (Motrin) at 1210.  Do not take any ibuprofen products until 6:10pm.    You had one oxycodone at 2:45pm    GENERAL ANESTHESIA OR SEDATION ADULT DISCHARGE INSTRUCTIONS   SPECIAL PRECAUTIONS FOR 24 HOURS AFTER SURGERY    IT IS NOT UNUSUAL TO FEEL LIGHT-HEADED OR FAINT, UP TO 24 HOURS AFTER SURGERY OR WHILE TAKING PAIN MEDICATION.  IF YOU HAVE THESE SYMPTOMS; SIT FOR A FEW MINUTES BEFORE STANDING AND HAVE SOMEONE ASSIST YOU WHEN YOU GET UP TO WALK OR USE THE BATHROOM.    YOU SHOULD REST AND RELAX FOR THE NEXT 24 HOURS AND YOU MUST MAKE ARRANGEMENTS TO HAVE SOMEONE STAY WITH YOU FOR AT LEAST 24 HOURS AFTER YOUR DISCHARGE.  AVOID HAZARDOUS AND STRENUOUS ACTIVITIES.  DO NOT MAKE IMPORTANT DECISIONS FOR 24 HOURS.    DO NOT DRIVE ANY VEHICLE OR OPERATE MECHANICAL EQUIPMENT FOR 24 HOURS FOLLOWING THE END OF YOUR SURGERY.  EVEN THOUGH YOU MAY FEEL NORMAL, YOUR REACTIONS MAY BE AFFECTED BY THE MEDICATION YOU HAVE RECEIVED.    DO NOT DRINK ALCOHOLIC BEVERAGES FOR 24 HOURS FOLLOWING YOUR SURGERY.    DRINK CLEAR LIQUIDS (APPLE JUICE, GINGER ALE, 7-UP, BROTH, ETC.).  PROGRESS TO YOUR REGULAR DIET AS YOU FEEL ABLE.    YOU MAY HAVE A DRY MOUTH, A SORE THROAT, MUSCLES ACHES OR TROUBLE SLEEPING.  THESE SHOULD GO AWAY AFTER 24 HOURS.    CALL YOUR DOCTOR FOR ANY OF THE FOLLOWING:  SIGNS OF INFECTION (FEVER, GROWING TENDERNESS AT THE SURGERY SITE, A LARGE AMOUNT OF DRAINAGE OR BLEEDING, SEVERE PAIN, FOUL-SMELLING DRAINAGE, REDNESS OR SWELLING.    IT HAS BEEN OVER 8 TO 10 HOURS SINCE SURGERY AND YOU ARE STILL NOT ABLE TO URINATE (PASS WATER.    LAPAROSCOPY, HYSTEROSCOPY OR PELVISCOPY DISCHARGE INSTRUCTIONS      DO NOT DRIVE A CAR, DRINK ALCOHOL OR USE MACHINERY FOR THE NEXT 24 HOURS.  YOU SHOULD WAIT UNTIL YOU HAVE RECOVERED BEFORE MAKING ANY IMPORTANT DECISIONS.    PAIN  YOU MAY HAVE CRAMPS, SHOULDER PAIN OR A  "LOW BACKACHE FOR 24 TO 48 HOURS.  TYLENOL (ACETAMINOPHEN) OR MOTRIN (IBUPROFEN) MAY HELP, OR YOUR DOCTOR MAY GIVE YOU PAIN MEDICINE.  CALL YOUR DOCTOR IF PAIN CANNOT BE CONTROLLED.    BLEEDING OR VAGINAL DISCHARGE  YOU MAY HAVE SOME BLEEDING OR DISCHARGE FOR UP TO A WEEK OR LONGER.  DO NOT DOUCHE, USE TAMPONS OR HAVE SEX (INTERCOURSE) FOR ______DAYS.  CALL YOUR DOCTOR IF YOU SOAK MORE THAN ONE MAXI PAD (SANITARY NAPKIN) PER HOUR, OR IF YOU PASS LARGE BLOOD CLOTS.    FEVER  YOU MAY HAVE A LOW FEVER FOR THE FIRST TWO DAYS.  CALL YOUR DOCTOR IF IT GOES OVER 101 DEGREES.    NAUSEA  IF YOU HAVE NAUSEA (FEEL SICK TO YOUR STOMACH), STAY IN BED.  TRY DRINKING A SMALL AMOUNT OF 7-UP, TEA OR SOUP.    SWOLLEN BELLY  IF YOUR ABDOMEN (BELLY AREA) FEELS FIRM OR SWOLLEN, CALL YOUR DOCTOR.    DIZZINESS AND WEAKNESS  YOU MAY FEEL DIZZY OR WEAK FOR A FEW DAYS.  IF SO, YOU SHOULD REST OFTEN, STAND UP SLOWLY AND USE CARE WHEN CLIMBING STAIRS.    DIET AND ACTIVITY  EAT LIGHT MEALS AND DRINK PLENTY OF FLUIDS FOR THE FIRST 24 HOURS (OR LONGER, IF YOU HAVE NAUSEA).  WAIT 5 DAYS BEFORE BATHING.  SHOWERS ARE OKAY.  MOST WOMEN CAN RETURN TO WORK AFTER 24 HOURS.  YOU MAY GO BACK TO YOUR OTHER ACTIVITIES AFTER YOUR PAIN GOES AWAY.          IF YOU HAVE STITCHES  YOU MAY REMOVE YOUR BANDAGE THE DAY AFTER TREATMENT.  YOUR DOCTOR WILL TELL YOU IF YOUR STITCHES NEED TO BE REMOVED.  SOME STITCHES DISSOLVE OVER TIME.         DR. ROBERT MCCLAIN M.D.   CLINIC PHONE NUMBER:  [phone].      Pending Results     No orders found from 11/19/2017 to 11/22/2017.            Admission Information     Date & Time Provider Department Dept. Phone    11/21/2017 Robert Mcclain MD [facility] PreOP/PostOP [phone]      Your Vitals Were     Blood Pressure Temperature Respirations Height Weight Last Period    129/65 (BP Location: Right arm) 97.9  F (36.6  C) (Temporal) 20 1.638 m (5' 4.5\") 94.5 kg (208 lb 4.8 oz) 11/01/2016    Pulse Oximetry BMI " "(Body Mass Index)                99% 35.2 kg/m2          [employer] Information     [employer] lets you send messages to your doctor, view your test results, renew your prescriptions, schedule appointments and more. To sign up, go to [email or web address] . Click on \"Log in\" on the left side of the screen, which will take you to the Welcome page. Then click on \"Sign up Now\" on the right side of the page.     You will be asked to enter the access code listed below, as well as some personal information. Please follow the directions to create your username and password.     Your access code is: [email or web address]  Expires: 2017  2:33 PM     Your access code will  in 90 days. If you need help or a new code, please call your [place] clinic or [phone].        Care EveryWhere ID     This is your Care EveryWhere ID. This could be used by other organizations to access your [place] medical records  [id number]        Equal Access to Services     [facility] : Hadii amanda pereao Sokelvin, waaxda luqadaha, qaybta kaalmada adekodak, freddy rodriguez . So [facility] [phone].    ATENCIÓN: Si habla español, tiene a grider disposición servicios gratuitos de asistencia lingüística. Llame al [phone].    We comply with applicable federal civil rights laws and [place] laws. We do not discriminate on the basis of race, color, national origin, age, disability, sex, sexual orientation, or gender identity.               Review of your medicines      START taking        Dose / Directions    oxyCODONE IR 5 MG tablet   Commonly known as:  ROXICODONE        Dose:  5-10 mg   Take 1-2 tablets (5-10 mg) by mouth every 3 hours as needed for pain or other (Moderate to Severe)   Quantity:  15 tablet   Refills:  0         CONTINUE these medicines which have NOT CHANGED        Dose / Directions    CELEXA PO   Indication:  Aggressive Behavior        Dose:  40 mg   Take 40 mg by mouth   Refills:  0       ibuprofen " 600 MG tablet   Commonly known as:  ADVIL/MOTRIN   Used for:  Low forceps delivery, delivered, current hospitalization        Dose:  600 mg   Take 1 tablet (600 mg) by mouth every 6 hours as needed for pain Take w/ food   Quantity:  30 tablet   Refills:  0            Where to get your medicines      Some of these will need a paper prescription and others can be bought over the counter. Ask your nurse if you have questions.     Bring a paper prescription for each of these medications     oxyCODONE IR 5 MG tablet                Protect others around you: Learn how to safely use, store and throw away your medicines at www.disposemymeds.org.             Medication List: This is a list of all your medications and when to take them. Check marks below indicate your daily home schedule. Keep this list as a reference.      Medications           Morning Afternoon Evening Bedtime As Needed    CELEXA PO   Take 40 mg by mouth                                ibuprofen 600 MG tablet   Commonly known as:  ADVIL/MOTRIN   Take 1 tablet (600 mg) by mouth every 6 hours as needed for pain Take w/ food                                oxyCODONE IR 5 MG tablet   Commonly known as:  ROXICODONE   Take 1-2 tablets (5-10 mg) by mouth every 3 hours as needed for pain or other (Moderate to Severe)   Last time this was given:  5 mg on 11/21/2017  2:48 PM

## 2017-11-21 NOTE — BRIEF OP NOTE
[facility] Brief Operative Note    Pre-operative diagnosis: Electiive Sterilization    Post-operative diagnosis s/p tubal ligation     Procedure: Procedure(s):  LAPAROSCOPIC BILATERAL TUBAL LIGATION - Wound Class: II-Clean Contaminated   Surgeon(s): Surgeon(s) and Role:     * Darren Verdugo MD - Primary   Estimated blood loss: 5 mL    Specimens: * No specimens in log *   Findings: Normal pelvis

## 2017-11-21 NOTE — ANESTHESIA CARE TRANSFER NOTE
Patient: Rocío Castle    Procedure(s):  LAPAROSCOPIC BILATERAL TUBAL LIGATION - Wound Class: II-Clean Contaminated    Diagnosis: Elcyive Sterilization   Diagnosis Additional Information: No value filed.    Anesthesia Type:   General, ETT     Note:  Airway :Face Mask  Patient transferred to:PACU  Comments: Report and sign off to RN in PACU.  Good resps, skin pink, monitors on, VSS,  O2 via Face Tent.Handoff Report: Identifed the Patient, Identified the Reponsible Provider, Reviewed the pertinent medical history, Discussed the surgical course, Reviewed Intra-OP anesthesia mangement and issues during anesthesia, Set expectations for post-procedure period and Allowed opportunity for questions and acknowledgement of understanding      Vitals: (Last set prior to Anesthesia Care Transfer)    CRNA VITALS  11/21/2017 1307 - 11/21/2017 1343      11/21/2017             Pulse: 88    SpO2: (!)  88 %    Resp Rate (observed): 21    EKG: NSR                Electronically Signed By: JORDI Merchant CRNA  November 21, 2017  1:43 PM

## 2017-11-21 NOTE — IP AVS SNAPSHOT
[facility] PreOP/PostOP    [address]    [facility] [address]    Phone:  [phone]    Fax:  [phone]                                       After Visit Summary   11/21/2017    Rocío Castle    MRN: [id number]           After Visit Summary Signature Page     I have received my discharge instructions, and my questions have been answered. I have discussed any challenges I see with this plan with the nurse or doctor.    ..........................................................................................................................................  Patient/Patient Representative Signature      ..........................................................................................................................................  Patient Representative Print Name and Relationship to Patient    ..................................................               ................................................  Date                                            Time    ..........................................................................................................................................  Reviewed by Signature/Title    ...................................................              ..............................................  Date                                                            Time

## 2017-11-21 NOTE — DISCHARGE INSTRUCTIONS
Maximum acetaminophen (Tylenol) dose from all sources should not exceed 4 grams (4000 mg) per day.  You had 1,000mg at 11:20am.  Do not take any until 5:20pm.    You received Toradol, an IV form of ibuprofen (Motrin) at 1210.  Do not take any ibuprofen products until 6:10pm.    You had one oxycodone at 2:45pm    GENERAL ANESTHESIA OR SEDATION ADULT DISCHARGE INSTRUCTIONS   SPECIAL PRECAUTIONS FOR 24 HOURS AFTER SURGERY    IT IS NOT UNUSUAL TO FEEL LIGHT-HEADED OR FAINT, UP TO 24 HOURS AFTER SURGERY OR WHILE TAKING PAIN MEDICATION.  IF YOU HAVE THESE SYMPTOMS; SIT FOR A FEW MINUTES BEFORE STANDING AND HAVE SOMEONE ASSIST YOU WHEN YOU GET UP TO WALK OR USE THE BATHROOM.    YOU SHOULD REST AND RELAX FOR THE NEXT 24 HOURS AND YOU MUST MAKE ARRANGEMENTS TO HAVE SOMEONE STAY WITH YOU FOR AT LEAST 24 HOURS AFTER YOUR DISCHARGE.  AVOID HAZARDOUS AND STRENUOUS ACTIVITIES.  DO NOT MAKE IMPORTANT DECISIONS FOR 24 HOURS.    DO NOT DRIVE ANY VEHICLE OR OPERATE MECHANICAL EQUIPMENT FOR 24 HOURS FOLLOWING THE END OF YOUR SURGERY.  EVEN THOUGH YOU MAY FEEL NORMAL, YOUR REACTIONS MAY BE AFFECTED BY THE MEDICATION YOU HAVE RECEIVED.    DO NOT DRINK ALCOHOLIC BEVERAGES FOR 24 HOURS FOLLOWING YOUR SURGERY.    DRINK CLEAR LIQUIDS (APPLE JUICE, GINGER ALE, 7-UP, BROTH, ETC.).  PROGRESS TO YOUR REGULAR DIET AS YOU FEEL ABLE.    YOU MAY HAVE A DRY MOUTH, A SORE THROAT, MUSCLES ACHES OR TROUBLE SLEEPING.  THESE SHOULD GO AWAY AFTER 24 HOURS.    CALL YOUR DOCTOR FOR ANY OF THE FOLLOWING:  SIGNS OF INFECTION (FEVER, GROWING TENDERNESS AT THE SURGERY SITE, A LARGE AMOUNT OF DRAINAGE OR BLEEDING, SEVERE PAIN, FOUL-SMELLING DRAINAGE, REDNESS OR SWELLING.    IT HAS BEEN OVER 8 TO 10 HOURS SINCE SURGERY AND YOU ARE STILL NOT ABLE TO URINATE (PASS WATER.    LAPAROSCOPY, HYSTEROSCOPY OR PELVISCOPY DISCHARGE INSTRUCTIONS      DO NOT DRIVE A CAR, DRINK ALCOHOL OR USE MACHINERY FOR THE NEXT 24 HOURS.  YOU SHOULD WAIT UNTIL YOU HAVE RECOVERED BEFORE  MAKING ANY IMPORTANT DECISIONS.    PAIN  YOU MAY HAVE CRAMPS, SHOULDER PAIN OR A LOW BACKACHE FOR 24 TO 48 HOURS.  TYLENOL (ACETAMINOPHEN) OR MOTRIN (IBUPROFEN) MAY HELP, OR YOUR DOCTOR MAY GIVE YOU PAIN MEDICINE.  CALL YOUR DOCTOR IF PAIN CANNOT BE CONTROLLED.    BLEEDING OR VAGINAL DISCHARGE  YOU MAY HAVE SOME BLEEDING OR DISCHARGE FOR UP TO A WEEK OR LONGER.  DO NOT DOUCHE, USE TAMPONS OR HAVE SEX (INTERCOURSE) FOR ______DAYS.  CALL YOUR DOCTOR IF YOU SOAK MORE THAN ONE MAXI PAD (SANITARY NAPKIN) PER HOUR, OR IF YOU PASS LARGE BLOOD CLOTS.    FEVER  YOU MAY HAVE A LOW FEVER FOR THE FIRST TWO DAYS.  CALL YOUR DOCTOR IF IT GOES OVER 101 DEGREES.    NAUSEA  IF YOU HAVE NAUSEA (FEEL SICK TO YOUR STOMACH), STAY IN BED.  TRY DRINKING A SMALL AMOUNT OF 7-UP, TEA OR SOUP.    SWOLLEN BELLY  IF YOUR ABDOMEN (BELLY AREA) FEELS FIRM OR SWOLLEN, CALL YOUR DOCTOR.    DIZZINESS AND WEAKNESS  YOU MAY FEEL DIZZY OR WEAK FOR A FEW DAYS.  IF SO, YOU SHOULD REST OFTEN, STAND UP SLOWLY AND USE CARE WHEN CLIMBING STAIRS.    DIET AND ACTIVITY  EAT LIGHT MEALS AND DRINK PLENTY OF FLUIDS FOR THE FIRST 24 HOURS (OR LONGER, IF YOU HAVE NAUSEA).  WAIT 5 DAYS BEFORE BATHING.  SHOWERS ARE OKAY.  MOST WOMEN CAN RETURN TO WORK AFTER 24 HOURS.  YOU MAY GO BACK TO YOUR OTHER ACTIVITIES AFTER YOUR PAIN GOES AWAY.          IF YOU HAVE STITCHES  YOU MAY REMOVE YOUR BANDAGE THE DAY AFTER TREATMENT.  YOUR DOCTOR WILL TELL YOU IF YOUR STITCHES NEED TO BE REMOVED.  SOME STITCHES DISSOLVE OVER TIME.         DR. ROBERT MCCLAIN M.D.   CLINIC PHONE NUMBER:  [phone].

## 2017-11-22 LAB — INTERPRETATION ECG - MUSE: NORMAL

## 2017-11-22 NOTE — OP NOTE
DATE OF PROCEDURE:  11/21/2017      PREOPERATIVE DIAGNOSIS:  Multiparity, desires permanent sterilization.      POSTOPERATIVE DIAGNOSIS:  Multiparity, desires permanent sterilization.      PROCEDURE:  Laparoscopic bilateral tubal ligation.      SURGEON:  Darren Verdugo MD      ANESTHESIA:  General.      INDICATIONS:  Rocío Castle is a 27-year-old female, para 2, who desires permanent sterilization.  Risks, benefits and alternatives of tubal ligation versus alternative forms of contraception, both permanent and reversible were discussed.  Male sterilization was also discussed.  The patient had expressed interest in tubal ligation several months ago and a copy of her signed tubal ligation consent form is on the chart.      OPERATIVE FINDINGS:  Normal pelvis.      OPERATIVE PROCEDURE:  After adequate general anesthesia was obtained, the patient was placed in dorsal lithotomy position, prepped and draped in the usual sterile fashion.  The bladder was sterilely catheterized for 25 mL of clear urine.  A sponge stick was inserted in the vagina and the perineum draped from the abdominal field.      A 5 mm infraumbilical incision was made, a Veress needle was inserted and its proper intraperitoneal positioning confirmed by the drop saline method.  A pneumoperitoneum was created using carbon dioxide with opening pressures of 3 mmHg.      After instillation of approximately 1.5 liters of carbon dioxide, the Veress needle was withdrawn and a 5 mm trocar and sheath inserted.  Trocar was removed, the laparoscope inserted with proper intraperitoneal positioning confirmed.      The pelvis was visualized, a stab incision was made 2 fingerbreadths above the symphysis pubis in the midline and a 5 mm trocar and sheath inserted.  Trocar was removed and a Thunderbeat device inserted.  Thunderbeat device was used to lift the midportion of the right fallopian tube and 3 contiguous areas were cauterized and the cauterized tissue   making sure that the area of cauterization extended through the full diameter of tube into the adjacent mesosalpinx.      The left fallopian tube was similarly grasped in its midportion, elevated and 3 contiguous areas of the tube cauterized and .      The accessory instrument was then removed, the accessory port removed under direct visualization, the abdomen evacuated of the carbon dioxide to the extent possible and the laparoscope and laparoscopic sheath removed.      The skin edges were reapproximated using interrupted sutures of 4-0 Monocryl.  Steri-Strips and sterile bandages were placed.  The patient was recalled from general anesthesia and taken to recovery room in satisfactory condition.  There were no intraoperative complications.  Estimated blood loss was minimal.         ROBERT MCCLAIN MD             D: 2017 13:43   T: 2017 18:02   MT: [id number]      Name:     ASUNCION MARCOS   MRN:      [id number]        Account:        [id number]   :      1990           Procedure Date: 2017      Document: [id number]       cc: Robert Mcclain MD

## 2017-12-29 ENCOUNTER — TELEPHONE (OUTPATIENT)
Dept: BEHAVIORAL HEALTH | Facility: CLINIC | Age: 27
End: 2017-12-29

## 2017-12-29 ENCOUNTER — HOSPITAL ENCOUNTER (INPATIENT)
Facility: CLINIC | Age: 27
LOS: 4 days | Discharge: HOME | DRG: 883 | End: 2018-01-02
Attending: EMERGENCY MEDICINE | Admitting: PSYCHIATRY & NEUROLOGY
Payer: COMMERCIAL

## 2017-12-29 DIAGNOSIS — F60.3 BORDERLINE PERSONALITY DISORDER (H): ICD-10-CM

## 2017-12-29 DIAGNOSIS — F33.2 SEVERE EPISODE OF RECURRENT MAJOR DEPRESSIVE DISORDER, WITHOUT PSYCHOTIC FEATURES (H): Primary | ICD-10-CM

## 2017-12-29 DIAGNOSIS — R45.851 SUICIDAL IDEATION: ICD-10-CM

## 2017-12-29 PROBLEM — F32.9 MAJOR DEPRESSION: Status: ACTIVE | Noted: 2017-12-29

## 2017-12-29 LAB
AMPHETAMINES UR QL SCN: NEGATIVE
ANION GAP SERPL CALCULATED.3IONS-SCNC: 7 MMOL/L (ref 3–14)
BARBITURATES UR QL: NEGATIVE
BENZODIAZ UR QL: NEGATIVE
BUN SERPL-MCNC: 8 MG/DL (ref 7–30)
CALCIUM SERPL-MCNC: 8.6 MG/DL (ref 8.5–10.1)
CANNABINOIDS UR QL SCN: NEGATIVE
CHLORIDE SERPL-SCNC: 106 MMOL/L (ref 94–109)
CO2 SERPL-SCNC: 27 MMOL/L (ref 20–32)
COCAINE UR QL: NEGATIVE
CREAT SERPL-MCNC: 0.54 MG/DL (ref 0.52–1.04)
ERYTHROCYTE [DISTWIDTH] IN BLOOD BY AUTOMATED COUNT: 13.4 % (ref 10–15)
GFR SERPL CREATININE-BSD FRML MDRD: >90 ML/MIN/1.7M2
GLUCOSE SERPL-MCNC: 84 MG/DL (ref 70–99)
HCG UR QL: NEGATIVE
HCT VFR BLD AUTO: 38.1 % (ref 35–47)
HGB BLD-MCNC: 12.5 G/DL (ref 11.7–15.7)
MCH RBC QN AUTO: 27.7 PG (ref 26.5–33)
MCHC RBC AUTO-ENTMCNC: 32.8 G/DL (ref 31.5–36.5)
MCV RBC AUTO: 84 FL (ref 78–100)
OPIATES UR QL SCN: NEGATIVE
PCP UR QL SCN: NEGATIVE
PLATELET # BLD AUTO: 381 10E9/L (ref 150–450)
POTASSIUM SERPL-SCNC: 3.9 MMOL/L (ref 3.4–5.3)
RBC # BLD AUTO: 4.52 10E12/L (ref 3.8–5.2)
SODIUM SERPL-SCNC: 140 MMOL/L (ref 133–144)
TSH SERPL DL<=0.005 MIU/L-ACNC: 0.93 MU/L (ref 0.4–4)
WBC # BLD AUTO: 6.1 10E9/L (ref 4–11)

## 2017-12-29 PROCEDURE — 84443 ASSAY THYROID STIM HORMONE: CPT | Performed by: PSYCHIATRY & NEUROLOGY

## 2017-12-29 PROCEDURE — 90791 PSYCH DIAGNOSTIC EVALUATION: CPT

## 2017-12-29 PROCEDURE — 12400006 ZZH R&B MH INTERMEDIATE

## 2017-12-29 PROCEDURE — 36415 COLL VENOUS BLD VENIPUNCTURE: CPT | Performed by: PSYCHIATRY & NEUROLOGY

## 2017-12-29 PROCEDURE — 80307 DRUG TEST PRSMV CHEM ANLYZR: CPT | Performed by: EMERGENCY MEDICINE

## 2017-12-29 PROCEDURE — 85027 COMPLETE CBC AUTOMATED: CPT | Performed by: PSYCHIATRY & NEUROLOGY

## 2017-12-29 PROCEDURE — 81025 URINE PREGNANCY TEST: CPT | Performed by: EMERGENCY MEDICINE

## 2017-12-29 PROCEDURE — 80048 BASIC METABOLIC PNL TOTAL CA: CPT | Performed by: PSYCHIATRY & NEUROLOGY

## 2017-12-29 PROCEDURE — 90853 GROUP PSYCHOTHERAPY: CPT

## 2017-12-29 PROCEDURE — 25000132 ZZH RX MED GY IP 250 OP 250 PS 637: Performed by: PSYCHIATRY & NEUROLOGY

## 2017-12-29 PROCEDURE — 99285 EMERGENCY DEPT VISIT HI MDM: CPT | Mod: 25

## 2017-12-29 PROCEDURE — 99222 1ST HOSP IP/OBS MODERATE 55: CPT | Performed by: PHYSICIAN ASSISTANT

## 2017-12-29 RX ORDER — HYDROXYZINE HYDROCHLORIDE 25 MG/1
25-50 TABLET, FILM COATED ORAL EVERY 4 HOURS PRN
Status: DISCONTINUED | OUTPATIENT
Start: 2017-12-29 | End: 2018-01-02 | Stop reason: HOSPADM

## 2017-12-29 RX ORDER — QUETIAPINE FUMARATE 25 MG/1
25-50 TABLET, FILM COATED ORAL
Status: DISCONTINUED | OUTPATIENT
Start: 2017-12-29 | End: 2018-01-02 | Stop reason: HOSPADM

## 2017-12-29 RX ORDER — ASCORBIC ACID 500 MG
500 TABLET ORAL DAILY
Status: DISCONTINUED | OUTPATIENT
Start: 2017-12-30 | End: 2018-01-02 | Stop reason: HOSPADM

## 2017-12-29 RX ORDER — CITALOPRAM HYDROBROMIDE 20 MG/1
40 TABLET ORAL EVERY EVENING
Status: DISCONTINUED | OUTPATIENT
Start: 2017-12-29 | End: 2018-01-02 | Stop reason: HOSPADM

## 2017-12-29 RX ORDER — LANOLIN ALCOHOL/MO/W.PET/CERES
1000 CREAM (GRAM) TOPICAL DAILY
Status: DISCONTINUED | OUTPATIENT
Start: 2017-12-30 | End: 2018-01-02 | Stop reason: HOSPADM

## 2017-12-29 RX ADMIN — CITALOPRAM HYDROBROMIDE 40 MG: 20 TABLET ORAL at 20:40

## 2017-12-29 ASSESSMENT — ENCOUNTER SYMPTOMS: DYSPHORIC MOOD: 1

## 2017-12-29 NOTE — PHARMACY-ADMISSION MEDICATION HISTORY
Admission medication history interview status for the 12/29/2017  admission is complete. See EPIC admission navigator for prior to admission medications     Medication history source reliability:Good    Actions taken by pharmacist (provider contacted, etc):Information patient stated matched up with her CareEverywhere records.     Additional medication history information not noted on PTA med list :  1) Patient took 10mg of oxycodone yesterday but does not typically take medication.     Medication reconciliation/reorder completed by provider prior to medication history? No    Time spent in this activity: 10 minutes    Prior to Admission medications    Medication Sig Last Dose Taking? Auth Provider   CYANOCOBALAMIN PO Take 1,000 mcg by mouth daily Past Month at Unknown time Yes Unknown, Entered By History   Ascorbic Acid (VITAMIN C PO) Take 500 mg by mouth daily Past Month at Unknown time Yes Unknown, Entered By History   Citalopram Hydrobromide (CELEXA PO) Take 40 mg by mouth every evening  12/28/2017 at pm Yes Reported, Patient

## 2017-12-29 NOTE — ED PROVIDER NOTES
"  History     Chief Complaint:  Suicidal      HPI   Rocío Castle is a 27 year old female who presents to the emergency department today for evaluation of suicidal ideations. The patient reports progressively worsening suicidal ideations over the past month due to relationship challenges and feeling like she is having worsening issues with her borderline personality disorder. She states that she has been on Celexa for 8 to 10 years and feels that this is no longer controlling her depression. She presents to the emergency department for evaluation of possible alternate treatment options to better control her suicidal ideations and is interested in resources for improving her mental health. The patient states that she has not had a specific plan to kill herself and states that she does not believe she could truly commit suicide but does note that she too 2 oxycodone yesterday in an attempt to \"escape\". She reports that she does meet with a psychiatrist who she last saw 3 weeks ago and will see again in January. She has not discussed her current suicidal ideations with her psychiatrist. The patient denies any alcohol or drug use.       Allergies:  Blood Transfusion Related (Informational Only)  Paxil [Paroxetine Mesylate]  Percocet [Oxycodone-Acetaminophen]     Medications:    Citalopram Hydrobromide (CELEXA PO)    Past Medical History:    Anemia   Depressive disorder   Iron deficiency anemia during pregnancy, antepartum   Low forceps delivery, delivered, current hospitalization   Obesity   Panic attacks   Polyhydramnios, delivered, current hospitalization   Previous bariatric surgery complicating pregnancy, third trimester   Right occiput posterior presentation of fetus     Past Surgical History:    Dilation and curettage suction with ultrasound guidance  Gastric bypass  Laparoscopic tubal ligation    Family History:    History reviewed. No pertinent family history.     Social History:  The patient was " "alone.  Smoking Status: Never Smoker  Smokeless Tobacco: Never Used  Alcohol Use: No   Marital Status:  Single      Review of Systems   Psychiatric/Behavioral: Positive for dysphoric mood and suicidal ideas.   All other systems reviewed and are negative.    Physical Exam   First Vitals:  BP: (!) 142/92  Pulse: 78  Temp: 98.1  F (36.7  C)  Resp: 16  Height: 162.6 cm (5' 4\")  Weight: 90.7 kg (200 lb)  SpO2: 99 %    Physical Exam  General: woman sitting upright, occasionally distracted by TV, in room 17  HENT: mucous membranes moist  Eyes: PERRL without nystagmus  CV: extremities well perfused, regular rhythm  Resp:  normal effort  GI: abdomen soft and nontender, no guarding  MSK: no bony tenderness   Skin: appropriately warm and dry  Neuro: alert, clear speech, oriented, normal tone in extremities, ambulatory  Psych:  calm, cooperative, reports feeling suicidal, no evidence of hallucinations, good eye contact    Emergency Department Course     Laboratory:  Laboratory findings were communicated with the patient who voiced understanding of the findings.  Drug abuse screen 77 urine: Negative   HCG Qualitative Urine: Negative      Emergency Department Course:  Nursing notes and vitals reviewed.  1406: I performed an exam of the patient as documented above.   1410: I spoke with DEC regarding patient's presentation, findings, and plan of care.  1459: Per DEC, the patient will be admitted to an [facility] bed under the care of Dr. Merrill. The patient will continue to be a voluntary admit at this time.   Findings and plan explained to the Patient who consents to admission. The patient was discussed with Dr. Merrill, who will admit the patient to an [facility] bed for further monitoring, evaluation, and treatment.   I personally reviewed the laboratory results with the Patient and answered all related questions prior to admit.     Impression & Plan      Medical Decision Making:  Rocío Castle is a 27 year old female who presents " voluntarily for evaluation of depression with suicidal ideation. Medical precipitants were considered but are not suspected. She was seen by DEC who recommends hospitalization. Patient has been calm and cooperative and is voluntarily in agreement with this plan, so she was not placed on a hold. Dr. Merrill accepts care here to a mental health bed here at [facility].     Diagnosis:    ICD-10-CM    1. Suicidal ideation R45.851    2. Borderline personality disorder F60.3      Disposition:  Admitted to [facility]  Scribe Disclosure:  PAOLA, Marleen Echeverria, am serving as a scribe at 2:10 PM on 12/29/2017 to document services personally performed by Donny Walters MD based on my observations and the provider's statements to me.    12/29/2017    EMERGENCY DEPARTMENT       Donny Walters MD  12/29/17 7120

## 2017-12-29 NOTE — TELEPHONE ENCOUNTER
S - Kristine TERRY  calling with clinical for 28 yo female who is feeling passively suicidal.      B - pt has two young kids, under 2 years, lives with kids and boyfriend.  Pt has been feeling more and more out of control, constantly fighting with boyfriend, doing impulsive actions.  Pt admits to previous dx about borderline personality d/o, feels like her sxs are getting out of control and she can't cope.  Pt doesn't feel safe leaving hospital, having thoughts of suicide, took 2 oxycodone yesterday. Pt doesn't think she can keep herself safe from impulsive attempts to hurt herself if discharged.  Pt denies thoughts to hurt kids or boyfriend.  Pt is 3 months post-partum.  Pt saw a therapist 3 weeks ago.  PCP prescribes meds, celexa.  Pt did not talk to MD about changing meds.  Pt has some insight into her behaviors and seeking help to control and learn coping skills.  No medical concerns. Pt reports hx of alcoholism but has been sober for years.     A - vol     R - Dr. Chadwick accepted for Dr. Merrill / [place]

## 2017-12-29 NOTE — IP AVS SNAPSHOT
[place] [address] [place] [address]    Phone:  [phone]                                       After Visit Summary   12/29/2017    Rocío Castle    MRN: [id number]           After Visit Summary Signature Page     I have received my discharge instructions, and my questions have been answered. I have discussed any challenges I see with this plan with the nurse or doctor.    ..........................................................................................................................................  Patient/Patient Representative Signature      ..........................................................................................................................................  Patient Representative Print Name and Relationship to Patient    ..................................................               ................................................  Date                                            Time    ..........................................................................................................................................  Reviewed by Signature/Title    ...................................................              ..............................................  Date                                                            Time

## 2017-12-29 NOTE — PROGRESS NOTES
12/29/17 1709   Patient Belongings   Did you bring any home meds/supplements to the hospital?  No   Patient Belongings other (see comments)   Disposition of Belongings Locker   Belongings Search Yes   Clothing Search Yes   Second Staff Quirino      1. Jacket Vest   2. Pink Jacket   3. Boots   4. Tote bag   5. Flannel shirt   6. Jeans x2  7. Yoga pants x2  8. Bra   9. Tank tops x3  10. Blouse with strings   11. Hoodie   12. Blouse   13. Sweat pants with strings x2  14. Sweaters with strings x2  15. Keys x7  16. Hard boiled eggs   17. Wallet    Cosmetic products    5 dollars cash   [place] 's license    Insurance cards   18. Security envelope #[id number]   Visa [phone]   Visa [phone]   EBT [phone]    Admission:  I am responsible for any personal items that are not sent to the safe or pharmacy.  [facility] is not responsible for loss, theft or damage of any property in my possession.    Signature:  _________________________________ Date: _______  Time: _____                                              Staff Signature:  ____________________________ Date: ________  Time: _____      2nd Staff person, if patient is unable/unwilling to sign:    Signature: ________________________________ Date: ________  Time: _____     Discharge:  [facility] has returned all of my personal belongings:    Signature: _________________________________ Date: ________  Time: _____                                          Staff Signature:  ____________________________ Date: ________  Time: _____

## 2017-12-29 NOTE — IP AVS SNAPSHOT
MRN:[id number]                      After Visit Summary   12/29/2017    Rocío Castle    MRN: [id number]           Thank you!     Thank you for choosing [facility] for your care. Our goal is always to provide you with excellent care.        Patient Information     Date Of Birth          1990        About your hospital stay     You were admitted on:  December 29, 2017 You last received care in the:  [facility]    You were discharged on:  January 2, 2018       Who to Call     For medical emergencies, please call 911.  For non-urgent questions about your medical care, please call your primary care provider or clinic, [phone]          Attending Provider     Provider Specialty    Dnony Walters MD Emergency Medicine    [facility], Roque Grimm MD Psychiatry    [facility]       Primary Care Provider Office Phone # Fax #    Marleen Pop [phone]      Further instructions from your care team       Behavioral Discharge Planning and Instructions    Summary:  Admitted with depression and suicidal ideation.     Main Diagnosis:   Borderline Personality Disorder; Major Depressive Disorder, recurrent, moderate, in partial remission; Panic Disorder with agoraphobia, in remission.       Major Treatments, Procedures and Findings:  Psychiatric assessment. Medication adjustment.     Symptoms to Report: Losing more sleep, Mood getting worse or Thoughts of suicide    Lifestyle Adjustment:  Follow all treatment recommendations. Develop and follow safety plan. Utilize positive coping skills to manage symptoms of depression.     Psychiatry Follow-up:     You have an intake appointment for the DBT program at [facility]  in [place] on Tuesday, January 9, 2018 at 9:00 am with Nereyda Bolden. This is a two hour appointment. Please bring a photo ID and your insurance card with you to this appointment.     [facility]  " - [place]  [address]  [place] [address]  [phone] / [phone] fax or [phone] fax    You have a follow up psychiatry appointment with Dr. Beltran Castorena at [facility] in [place] on Friday, January 12, 2018 at 9:00 am. [facility] will be sending you a packet of forms in the mail prior to this appointment. Please complete these forms and bring them to your appointment.     [facility]  [address]  [place]  [address]  [phone] / Fax:  [phone]    Resources:   Crisis Intervention: 526.287.4926 or 814-674-6810 (TTY: 551.144.8892).  Call anytime for help.  National [place] on Mental Illness (www.[place].dary.org): 348.596.1969 or 625-503-5157.  National Suicide Prevention Line (www.mentalhealthmn.org): 618-388-XBWS (0691)  COPE (Crisis Services for Adults) in [facility]: 118.468.6916 (Available 24/7)    General Medication Instructions:   See your medication sheet(s) for instructions.   Take all medicines as directed.  Make no changes unless your doctor suggests them.   Go to all your doctor visits.  Be sure to have all your required lab tests. This way, your medicines can be refilled on time.  Do not use any drugs not prescribed by your doctor.  Avoid alcohol.      Pending Results     No orders found from 12/27/2017 to 12/30/2017.            Statement of Approval     Ordered          01/02/18 0933  I have reviewed and agree with all the recommendations and orders detailed in this document.  EFFECTIVE NOW     Approved and electronically signed by:  Leonid Chadwick MD             Admission Information     Date & Time Provider Department Dept. Phone    12/29/2017 Leonid Chadwick MD [facility] [phone]      Your Vitals Were     Blood Pressure Pulse Temperature Respirations Height Weight    122/79 108 97.6  F (36.4  C) (Oral) 15 1.626 m (5' 4\") 94.2 kg (207 lb 11.2 oz)    Pulse Oximetry BMI (Body Mass Index)       " "         100% 35.65 kg/m2          [facility] Information     [facility] lets you send messages to your doctor, view your test results, renew your prescriptions, schedule appointments and more. To sign up, go to [email or web address] . Click on \"Log in\" on the left side of the screen, which will take you to the Welcome page. Then click on \"Sign up Now\" on the right side of the page.     You will be asked to enter the access code listed below, as well as some personal information. Please follow the directions to create your username and password.     Your access code is: [email or web address]  Expires: 2018 12:21 PM     Your access code will  in 90 days. If you need help or a new code, please call your [facility] clinic or [phone].        Care EveryWhere ID     This is your Care EveryWhere ID. This could be used by other organizations to access your [facility] medical records  [id number]        Equal Access to Services     [facility] Gardens[facility] : Hadii amanda pereao Sokelvin, waaxda luqadaha, qaybta kaalmada adesabrinayarenee, freddy rodriguez . So [facility] [phone].    ATENCIÓN: Si habla español, tiene a grider disposición servicios gratuitos de asistencia lingüística. Llame al [phone].    We comply with applicable federal civil rights laws and [place] laws. We do not discriminate on the basis of race, color, national origin, age, disability, sex, sexual orientation, or gender identity.               Review of your medicines      START taking        Dose / Directions    lamoTRIgine 25 MG tablet   Commonly known as:  LaMICtal        Dose:  25 mg   Start taking on:  1/3/2018   Take 1 tablet (25 mg) by mouth daily for 10 days then increase to 2 tablets (50mg) on 18.   Quantity:  40 tablet   Refills:  0         CONTINUE these medicines which have NOT CHANGED        Dose / Directions    CELEXA PO   Indication:  Aggressive Behavior        Dose:  40 mg   Take 40 mg by mouth every evening   Refills:  0       " CYANOCOBALAMIN PO        Dose:  1000 mcg   Take 1,000 mcg by mouth daily   Refills:  0       VITAMIN C PO        Dose:  500 mg   Take 500 mg by mouth daily   Refills:  0            Where to get your medicines      These medications were sent to [facility] [address] [place] [address] [address]  [address] [place] [facility] [address]     Phone:  [phone]     lamoTRIgine 25 MG tablet                Protect others around you: Learn how to safely use, store and throw away your medicines at www.disposemymeds.org.             Medication List: This is a list of all your medications and when to take them. Check marks below indicate your daily home schedule. Keep this list as a reference.      Medications           Morning Afternoon Evening Bedtime As Needed    CELEXA PO   Take 40 mg by mouth every evening   Last time this was given:  40 mg on 1/1/2018  7:46 PM                                   CYANOCOBALAMIN PO   Take 1,000 mcg by mouth daily   Last time this was given:  1,000 mcg on 1/2/2018  8:58 AM                                   lamoTRIgine 25 MG tablet   Commonly known as:  LaMICtal   Take 1 tablet (25 mg) by mouth daily for 10 days then increase to 2 tablets (50mg) on 1/13/18.   Start taking on:  1/3/2018   Last time this was given:  25 mg on 1/2/2018  8:58 AM                                   VITAMIN C PO   Take 500 mg by mouth daily   Last time this was given:  500 mg on 1/2/2018  8:58 AM                                             More Information        Depression: Tips to Help Yourself  As your healthcare providers help treat your depression, you can also help yourself. Keep in mind that your illness affects you emotionally, physically, mentally, and socially. So full recovery will take time. Take care of your body and your soul, and be patient with yourself as you get better.    Self-care    Educate yourself. Read about treatment and medicine options. If you have the energy, attend  local conferences or support groups. Keep a list of useful websites and helpful books and use them as needed. This illness is not your fault. Don t blame yourself for your depression.    Manage early symptoms. If you notice symptoms returning, experience triggers, or identify other factors that may lead to a depressive episode, get help as soon as possible. Ask trusted friends and family to monitor your behavior and let you know if they see anything of concern.    Work with your provider. Find a provider you can trust. Communicate honestly with that person and share information on your treatment for depression and your reaction to medicines.    Be prepared for a crisis. Know what to do if you experience a crisis. Keep the phone number of a crisis hotline and know the location of your community's urgent care centers and the closest emergency department.    Hold off on big decisions. Depression can cloud your judgment. So wait until you feel better before making major life decisions, such as changing jobs, moving, or getting  or .    Be patient. Recovering from depression is a process. Don t be discouraged if it takes some time to feel better.    Keep it simple. Depression saps your energy and concentration. So you won t be able to do all the things you used to do. Set small goals and do what you can.    Be with others. Don t isolate yourself--you ll only feel worse. Try to be with other people. And take part in fun activities when you can. Go to a movie, ballgame, Church service, or social event. Talk openly with people you can trust. And accept help when it s offered.  Take care of your body  People with depression often lose the desire to take care of themselves. That only makes their problems worse. During treatment and afterward, make a point to:    Exercise. It s a great way to take care of your body. And studies have shown that exercise helps fight depression.    Avoid drugs and alcohol. These  may ease the pain in the short term. But they ll only make your problems worse in the long run.    Get relief from stress. Ask your healthcare provider for relaxation exercises and techniques to help relieve stress.    Eat right. A balanced and healthy diet helps keep your body healthy.  Date Last Reviewed: 1/1/2017 2000-2017 [employer]. [address] [place] [address], [place] [address]. All rights reserved. This information is not intended as a substitute for professional medical care. Always follow your healthcare professional's instructions.

## 2017-12-29 NOTE — ED NOTES
"Pt presents herself because she doesn't \"like herself.\" Pt has had suicidal thoughts although she states she'd never actually commit suicide, she just wants to \"escape.\" Yesterday she took 2 oxycodone to escape.   "

## 2017-12-30 PROCEDURE — 90853 GROUP PSYCHOTHERAPY: CPT

## 2017-12-30 PROCEDURE — 25000132 ZZH RX MED GY IP 250 OP 250 PS 637: Performed by: PSYCHIATRY & NEUROLOGY

## 2017-12-30 PROCEDURE — 12400006 ZZH R&B MH INTERMEDIATE

## 2017-12-30 RX ORDER — LAMOTRIGINE 25 MG/1
25 TABLET ORAL DAILY
Status: DISCONTINUED | OUTPATIENT
Start: 2017-12-30 | End: 2018-01-02 | Stop reason: HOSPADM

## 2017-12-30 RX ADMIN — LAMOTRIGINE 25 MG: 25 TABLET ORAL at 13:10

## 2017-12-30 RX ADMIN — CITALOPRAM HYDROBROMIDE 40 MG: 20 TABLET ORAL at 19:09

## 2017-12-30 RX ADMIN — CYANOCOBALAMIN TAB 1000 MCG 1000 MCG: 1000 TAB at 08:19

## 2017-12-30 RX ADMIN — OXYCODONE HYDROCHLORIDE AND ACETAMINOPHEN 500 MG: 500 TABLET ORAL at 08:19

## 2017-12-30 ASSESSMENT — ACTIVITIES OF DAILY LIVING (ADL)
GROOMING: INDEPENDENT
ORAL_HYGIENE: INDEPENDENT
DRESS: STREET CLOTHES

## 2017-12-30 NOTE — PLAN OF CARE
Problem: Depressive Symptoms  Goal: Depressive Symptoms  Signs and symptoms of listed problems will be absent or manageable.   Outcome: No Change  Pt has been present and pleasant in the ITC and SDU. Presents a sad, flat affect, but is respectful to peers and staff. Attends groups and participates accordingly. Ate most of her meals for breakfast and lunch.
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
## 2017-12-30 NOTE — PLAN OF CARE
Problem: Depressive Symptoms  Goal: Depressive Symptoms  Signs and symptoms of listed problems will be absent or manageable.   Outcome: No Change  Patient pleasant and cooperative.  visited and brought children. Visible and social on the unit. Attended group and participated appropriately.
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
## 2017-12-30 NOTE — H&P
PRIMARY CARE PROVIDER:  Marleen Pop NP.      CHIEF COMPLAINT:  Depression.      HISTORY OF PRESENT ILLNESS:  Rocío Castle is a 27-year-old female with past medical history of obesity and gastric bypass as well as depression who presented to the Emergency Department today for evaluation of worsening depression and suicidal ideation.  She states that she feels as though her medications are no longer controlling her symptoms.  She complains of suicidal ideation with no specific plan.  She is admitted to inpatient Psychiatry.      The patient was evaluated on the [facility] Psychiatry.  She offers no complaints at this time.  Denies recent fevers or chills.  No chest pain or shortness breath.  No nausea or vomiting.      PAST MEDICAL HISTORY:   1.  Borderline personality disorder.   2.  Depression.   3.  Obesity, status post gastric bypass.   4.  Iron deficiency anemia.      PRIOR TO ADMISSION MEDICATIONS:   Prior to Admission medications    Medication Sig Last Dose Taking? Auth Provider   lamoTRIgine (LAMICTAL) 25 MG tablet Take 1 tablet (25 mg) by mouth daily for 10 days then increase to 2 tablets (50mg) on 1/13/18.  Yes Leonid Chadwick MD   CYANOCOBALAMIN PO Take 1,000 mcg by mouth daily Past Month at Unknown time Yes Unknown, Entered By History   Ascorbic Acid (VITAMIN C PO) Take 500 mg by mouth daily Past Month at Unknown time Yes Unknown, Entered By History   Citalopram Hydrobromide (CELEXA PO) Take 40 mg by mouth every evening  12/28/2017 at pm Yes Reported, Patient          ALLERGIES:  Blood transfusion, Paxil and Percocet.      PAST SURGICAL HISTORY:   1.  D&C   2.  Gastric bypass.   3.  Tubal ligation.      FAMILY HISTORY:  Denies family history of heart disease, cancer or diabetes.      SOCIAL HISTORY:  She is a nonsmoker, does not drink alcohol.  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      REVIEW OF SYSTEMS:  A 10-point review of systems was completed.  Pertinent  positives are noted in the HPI, all other systems negative.      PHYSICAL EXAMINATION:   GENERAL:  Rocío Wong is a well-developed, well-nourished 27-year-old female who is sitting in bed in no acute distress.   VITAL SIGNS:  Blood pressure is 110/51, heart rate 56, temperature 98.5.   HEENT:  Normocephalic, atraumatic.  Eyes:  Pupils equal, round, react to light.   NECK:  Supple, no adenopathy or thyromegaly.   CARDIOVASCULAR:  Regular rate and rhythm, no murmurs.   PULMONARY:  Normal effort.  Lungs are clear to auscultation bilaterally.   GASTROINTESTINAL:  Soft, nontender, nondistended.   EXTREMITIES:  Moves all 4 extremities.  Dorsalis pedis and radial pulses bilaterally.   NEUROLOGIC:  Oriented.  Cranial nerves II through XII grossly intact.      LABORATORY DATA:  Reviewed in Epic.      ASSESSMENT:  Rocío Wong is a 27-year-old female with past medical history of obesity and depression who is admitted to inpatient Psychiatry for worsening depression and suicidal ideation.  Hospitalist Service was consulted for medical management.   1.  Depression with suicidal ideation:  Defer management to inpatient Psychiatry.   2.  Obesity, status post gastric bypass.  BMI of 35.6.   3.  Deep venous thrombosis prophylaxis:  PCDs.   4.  CODE STATUS:  Full code.      We, the Hospitalist Service, thank you for this consult.  The patient is medically stable.  Hospitalist Service will sign off.  Please do not hesitate to call if additional concerns arise.      This patient was discussed with Dr. Silver of the Hospitalist Service who independently interviewed the patient.  He is in agreement with plan.         KIM SILVER MD       As dictated by TORREY BUCIO PA-C            D: 2017 22:07   T: 2017 23:37   MT:       Name:     ROCÍO MARCOS   MRN:      [id number]        Account:      [id number]   :      1990           Admitted:     [id number]      Document: [id number]

## 2017-12-31 PROCEDURE — 90853 GROUP PSYCHOTHERAPY: CPT

## 2017-12-31 PROCEDURE — 25000132 ZZH RX MED GY IP 250 OP 250 PS 637: Performed by: PSYCHIATRY & NEUROLOGY

## 2017-12-31 PROCEDURE — 12400006 ZZH R&B MH INTERMEDIATE

## 2017-12-31 RX ADMIN — LAMOTRIGINE 25 MG: 25 TABLET ORAL at 08:48

## 2017-12-31 RX ADMIN — OXYCODONE HYDROCHLORIDE AND ACETAMINOPHEN 500 MG: 500 TABLET ORAL at 08:48

## 2017-12-31 RX ADMIN — CYANOCOBALAMIN TAB 1000 MCG 1000 MCG: 1000 TAB at 08:48

## 2017-12-31 RX ADMIN — CITALOPRAM HYDROBROMIDE 40 MG: 20 TABLET ORAL at 20:36

## 2017-12-31 NOTE — PLAN OF CARE
Problem: Depressive Symptoms  Goal: Depressive Symptoms  Signs and symptoms of listed problems will be absent or manageable.   Outcome: No Change  Pt has been present and pleasant in the ITC and SDU throughout the day shift. Pt is respectful to peers and staff and is bright upon approach and communication. Ate most of her meals for breakfast and lunch. Attends groups and participates accordingly.

## 2018-01-01 PROCEDURE — 25000132 ZZH RX MED GY IP 250 OP 250 PS 637: Performed by: PSYCHIATRY & NEUROLOGY

## 2018-01-01 PROCEDURE — 12400006 ZZH R&B MH INTERMEDIATE

## 2018-01-01 PROCEDURE — 90853 GROUP PSYCHOTHERAPY: CPT

## 2018-01-01 RX ADMIN — CYANOCOBALAMIN TAB 1000 MCG 1000 MCG: 1000 TAB at 09:15

## 2018-01-01 RX ADMIN — LAMOTRIGINE 25 MG: 25 TABLET ORAL at 09:15

## 2018-01-01 RX ADMIN — CITALOPRAM HYDROBROMIDE 40 MG: 20 TABLET ORAL at 19:46

## 2018-01-01 RX ADMIN — OXYCODONE HYDROCHLORIDE AND ACETAMINOPHEN 500 MG: 500 TABLET ORAL at 09:15

## 2018-01-01 ASSESSMENT — ACTIVITIES OF DAILY LIVING (ADL)
HYGIENE/GROOMING: INDEPENDENT
ORAL_HYGIENE: INDEPENDENT
DRESS: STREET CLOTHES;INDEPENDENT

## 2018-01-01 NOTE — PLAN OF CARE
Problem: General Plan of Care (Inpatient Behavioral)  Goal: Plan of Care Review (Adult,OB,Behavioral)  The patient and/or their representative will communicate an understanding of their plan of care.   Outcome: No Change  Pt has been pleasant/cooperative, joking with staff approprietly. Intermittently leaving the ITC unit to SDU throughout the shift. Attended groups and was respectful of others.   Pt is requesting to have a past tomorrow 01/01/18.

## 2018-01-01 NOTE — PROGRESS NOTES
Calm and cooperative. Good insight into situation, seems optimistic about discharge home. Plans to receive help Plan to DC home tomorrow. Boyfriend and kids came to visit today. Imelda HUI

## 2018-01-02 VITALS
RESPIRATION RATE: 15 BRPM | DIASTOLIC BLOOD PRESSURE: 79 MMHG | WEIGHT: 207.7 LBS | TEMPERATURE: 97.6 F | OXYGEN SATURATION: 100 % | SYSTOLIC BLOOD PRESSURE: 122 MMHG | HEIGHT: 64 IN | HEART RATE: 108 BPM | BODY MASS INDEX: 35.46 KG/M2

## 2018-01-02 PROCEDURE — 25000132 ZZH RX MED GY IP 250 OP 250 PS 637: Performed by: PSYCHIATRY & NEUROLOGY

## 2018-01-02 PROCEDURE — 90853 GROUP PSYCHOTHERAPY: CPT

## 2018-01-02 PROCEDURE — 97150 GROUP THERAPEUTIC PROCEDURES: CPT | Mod: GO

## 2018-01-02 RX ORDER — LAMOTRIGINE 25 MG/1
25 TABLET ORAL DAILY
Qty: 40 TABLET | Refills: 0 | Status: SHIPPED | OUTPATIENT
Start: 2018-01-03

## 2018-01-02 RX ADMIN — CYANOCOBALAMIN TAB 1000 MCG 1000 MCG: 1000 TAB at 08:58

## 2018-01-02 RX ADMIN — LAMOTRIGINE 25 MG: 25 TABLET ORAL at 08:58

## 2018-01-02 RX ADMIN — OXYCODONE HYDROCHLORIDE AND ACETAMINOPHEN 500 MG: 500 TABLET ORAL at 08:58

## 2018-01-02 ASSESSMENT — ACTIVITIES OF DAILY LIVING (ADL)
DRESS: STREET CLOTHES
ORAL_HYGIENE: INDEPENDENT
GROOMING: INDEPENDENT

## 2018-01-02 NOTE — PROGRESS NOTES
Patient discharged denies suicidal ideation and has stable mood. Instructions gone over with patient regarding medications and follow up plan.  . Copies of discharge instructions ( After Visit Summary) given to patient.Pt left unit 1245. Staff helped pt bring belongings to car. Follow up with [facility]
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
## 2018-01-02 NOTE — PLAN OF CARE
Problem: Patient Care Overview  Goal: Discharge Needs Assessment   met with patient this morning to discuss follow up care. Physician had recommended that patient follow up with Dr. Beltran Castorena at [facility] as he had seen patient while rounding in the hospital this weekend. Patient is in agreement with a referral to see Dr Castorena as an outpatient. Patient would like to be set up with a DBT program.  [facility] [place] as patient lives nearby. Patient was agreeable with a referral to [facility] for DBT. Patient signed release of information forms for both providers. Patient has an intake for DBT at [facility] on 1/9/18 at 0900 with Nereyda Bolden. Patient has a follow up psychiatry appointment with Dr. Castorena at [facility] on 1/12/18 at 0900.

## 2018-01-02 NOTE — PLAN OF CARE
Problem: Depressive Symptoms  Goal: Depressive Symptoms  Signs and symptoms of listed problems will be absent or manageable.   Outcome: Improving  Patient presents with full range affect and calm mood. Pleasant and cooperative with peers and staff. Moved to SDU and adjusted well. Feels very ready for DC tomorrow. Excited to get back home to her family. Plans to start DBT after discharge, feels this will be beneficial. Showered this shift. Attended peer-led AA group and did a craft in OT. Med-compliant.

## 2018-01-02 NOTE — DISCHARGE INSTRUCTIONS
Behavioral Discharge Planning and Instructions    Summary:  Admitted with depression and suicidal ideation.     Main Diagnosis:   Borderline Personality Disorder; Major Depressive Disorder, recurrent, moderate, in partial remission; Panic Disorder with agoraphobia, in remission.       Major Treatments, Procedures and Findings:  Psychiatric assessment. Medication adjustment.     Symptoms to Report: Losing more sleep, Mood getting worse or Thoughts of suicide    Lifestyle Adjustment:  Follow all treatment recommendations. Develop and follow safety plan. Utilize positive coping skills to manage symptoms of depression.     Psychiatry Follow-up:     You have an intake appointment for the DBT program at [facility] on Tuesday, January 9, 2018 at 9:00 am with Nereyda Bolden. This is a two hour appointment. Please bring a photo ID and your insurance card with you to this appointment.     [facility] - [address], [address]  [place], [place] [address] [phone] / [phone] fax or [phone] fax    You have a follow up psychiatry appointment with Dr. Beltran Castorena at [facility] on Friday, January 12, 2018 at 9:00 am. [facility] will be sending you a packet of forms in the mail prior to this appointment. Please complete these forms and bring them to your appointment.     [facility]  [address] [place] [address], [address]  [place]  [address]  [phone] / Fax:  [phone]    Resources:   Crisis Intervention: 355.266.6747 or 903-453-2198 (TTY: 676.595.7926).  Call anytime for help.  National Powers on Mental Illness (www.[place].dary.org): 302.908.9652 or 759-849-1815.  National Suicide Prevention Line (www.mentalhealthmn.org): 322-807-ISPW (2005)  COPE (Crisis Services for Adults) in [facility]: [phone] (Available 24/7)    General Medication Instructions:   See your medication sheet(s) for instructions.   Take all medicines as directed.  Make no changes  unless your doctor suggests them.   Go to all your doctor visits.  Be sure to have all your required lab tests. This way, your medicines can be refilled on time.  Do not use any drugs not prescribed by your doctor.  Avoid alcohol.

## 2018-01-02 NOTE — PROGRESS NOTES
INITIAL O.T. ASSESSMENT   Details:  Pt participated in OT group today. Affect was bright. Interactions were pleasant and polite. Pt selected, planned, initiated, and completed a complex activity independently. She planned ahead and problem solved independently. Behavior was organized and goal directed. Attention was good during task performance. Pt communicated her needs effectively. Pt accepted positive feedback from this writer.

## 2018-06-29 VITALS
DIASTOLIC BLOOD PRESSURE: 75 MMHG | HEIGHT: 65 IN | OXYGEN SATURATION: 98 % | RESPIRATION RATE: 16 BRPM | HEART RATE: 83 BPM | WEIGHT: 194 LBS | SYSTOLIC BLOOD PRESSURE: 127 MMHG | TEMPERATURE: 98.5 F | BODY MASS INDEX: 32.32 KG/M2

## 2018-06-29 PROCEDURE — 99284 EMERGENCY DEPT VISIT MOD MDM: CPT | Mod: 25

## 2018-06-30 ENCOUNTER — APPOINTMENT (OUTPATIENT)
Dept: ULTRASOUND IMAGING | Facility: CLINIC | Age: 28
End: 2018-06-30
Attending: EMERGENCY MEDICINE
Payer: COMMERCIAL

## 2018-06-30 ENCOUNTER — HOSPITAL ENCOUNTER (EMERGENCY)
Facility: CLINIC | Age: 28
Discharge: HOME | End: 2018-06-30
Attending: EMERGENCY MEDICINE | Admitting: EMERGENCY MEDICINE
Payer: COMMERCIAL

## 2018-06-30 DIAGNOSIS — O21.9 VOMITING OR NAUSEA OF PREGNANCY: ICD-10-CM

## 2018-06-30 DIAGNOSIS — Z3A.01 LESS THAN 8 WEEKS GESTATION OF PREGNANCY: ICD-10-CM

## 2018-06-30 LAB
ANION GAP SERPL CALCULATED.3IONS-SCNC: 8 MMOL/L (ref 3–14)
B-HCG SERPL-ACNC: ABNORMAL IU/L (ref 0–5)
BASOPHILS # BLD AUTO: 0.1 10E9/L (ref 0–0.2)
BASOPHILS NFR BLD AUTO: 0.6 %
BUN SERPL-MCNC: 10 MG/DL (ref 7–30)
CALCIUM SERPL-MCNC: 8.4 MG/DL (ref 8.5–10.1)
CHLORIDE SERPL-SCNC: 107 MMOL/L (ref 94–109)
CO2 SERPL-SCNC: 24 MMOL/L (ref 20–32)
CREAT SERPL-MCNC: 0.58 MG/DL (ref 0.52–1.04)
DIFFERENTIAL METHOD BLD: NORMAL
EOSINOPHIL # BLD AUTO: 0.3 10E9/L (ref 0–0.7)
EOSINOPHIL NFR BLD AUTO: 3.4 %
ERYTHROCYTE [DISTWIDTH] IN BLOOD BY AUTOMATED COUNT: 13.1 % (ref 10–15)
GFR SERPL CREATININE-BSD FRML MDRD: >90 ML/MIN/1.7M2
GLUCOSE SERPL-MCNC: 136 MG/DL (ref 70–99)
HCT VFR BLD AUTO: 39.2 % (ref 35–47)
HGB BLD-MCNC: 13.1 G/DL (ref 11.7–15.7)
IMM GRANULOCYTES # BLD: 0 10E9/L (ref 0–0.4)
IMM GRANULOCYTES NFR BLD: 0.2 %
LYMPHOCYTES # BLD AUTO: 2.7 10E9/L (ref 0.8–5.3)
LYMPHOCYTES NFR BLD AUTO: 32.6 %
MCH RBC QN AUTO: 30.5 PG (ref 26.5–33)
MCHC RBC AUTO-ENTMCNC: 33.4 G/DL (ref 31.5–36.5)
MCV RBC AUTO: 91 FL (ref 78–100)
MONOCYTES # BLD AUTO: 0.5 10E9/L (ref 0–1.3)
MONOCYTES NFR BLD AUTO: 5.7 %
NEUTROPHILS # BLD AUTO: 4.7 10E9/L (ref 1.6–8.3)
NEUTROPHILS NFR BLD AUTO: 57.5 %
NRBC # BLD AUTO: 0 10*3/UL
NRBC BLD AUTO-RTO: 0 /100
PLATELET # BLD AUTO: 327 10E9/L (ref 150–450)
POTASSIUM SERPL-SCNC: 3.4 MMOL/L (ref 3.4–5.3)
RBC # BLD AUTO: 4.3 10E12/L (ref 3.8–5.2)
SODIUM SERPL-SCNC: 139 MMOL/L (ref 133–144)
WBC # BLD AUTO: 8.2 10E9/L (ref 4–11)

## 2018-06-30 PROCEDURE — 80048 BASIC METABOLIC PNL TOTAL CA: CPT | Performed by: EMERGENCY MEDICINE

## 2018-06-30 PROCEDURE — 85025 COMPLETE CBC W/AUTO DIFF WBC: CPT | Performed by: EMERGENCY MEDICINE

## 2018-06-30 PROCEDURE — 84702 CHORIONIC GONADOTROPIN TEST: CPT | Performed by: EMERGENCY MEDICINE

## 2018-06-30 PROCEDURE — 76801 OB US < 14 WKS SINGLE FETUS: CPT

## 2018-06-30 RX ORDER — METOCLOPRAMIDE 5 MG/1
5-10 TABLET ORAL EVERY 8 HOURS PRN
Qty: 20 TABLET | Refills: 0 | Status: ON HOLD | OUTPATIENT
Start: 2018-06-30 | End: 2019-02-16

## 2018-06-30 ASSESSMENT — ENCOUNTER SYMPTOMS
ABDOMINAL PAIN: 1
DIAPHORESIS: 0
UNEXPECTED WEIGHT CHANGE: 0
VOMITING: 0
NAUSEA: 1
HEMATURIA: 0
DIARRHEA: 0

## 2018-06-30 NOTE — ED AVS SNAPSHOT
[facility] Emergency Department    [address]    [facility] [address]    Phone:  [phone]    Fax:  [phone]                                       Rocío Castle   MRN: [id number]    Department:  [facility] Emergency Department   Date of Visit:  6/29/2018           After Visit Summary Signature Page     I have received my discharge instructions, and my questions have been answered. I have discussed any challenges I see with this plan with the nurse or doctor.    ..........................................................................................................................................  Patient/Patient Representative Signature      ..........................................................................................................................................  Patient Representative Print Name and Relationship to Patient    ..................................................               ................................................  Date                                            Time    ..........................................................................................................................................  Reviewed by Signature/Title    ...................................................              ..............................................  Date                                                            Time

## 2018-06-30 NOTE — ED TRIAGE NOTES
Pt reports 3 days of nausea, no vomiting. Pt also having bilateral lower abd pian x 3 days. Pt states she got her tubes tied in November but her period is late and she took a home pregnancy test today and it was positive.

## 2018-06-30 NOTE — ED NOTES
Discharge instructions gone over with pt, verbalized understanding. Discharged home with plan for follow up and new prescriptions. Denies questions at time of discharge.

## 2018-06-30 NOTE — ED PROVIDER NOTES
"  History     Chief Complaint:  Nausea and abdominal Pain    HPI   Rocío Castle is a 27 year old female with a positive at-home pregnancy test from tonight who presents with nausea and abdominal pain. The patient states that for the last three days she has been experiencing lower abdominal pain and nausea. The pain waxes and wanes. She denies any diarrhea, vomiting, blood in her urine, unexpected weight loss, or abnormal night sweats. The patient had a tubal ligation last November and was advised to come to the ED after her positive pregnancy test. Her last period was in early May and her blood type is 0+.      Allergies:  Blood Transfusion Related (Informational Only)  Paxil [Paroxetine Mesylate]  Percocet [Oxycodone-Acetaminophen]     Medications:    Celexa  Lamictal    Past Medical History:    Anemia  Depressive disorder    Past Surgical History:    Dilation and curettage  Gastric bypass  Tubal ligation    Family History:    No past pertinent family history.    Social History:  Negative for tobacco use.  Occasional alcohol use     Review of Systems   Constitutional: Negative for diaphoresis and unexpected weight change.   Gastrointestinal: Positive for abdominal pain and nausea. Negative for diarrhea and vomiting.   Genitourinary: Negative for hematuria.   All other systems reviewed and are negative.      Physical Exam   First Vitals:  BP: 127/75  Pulse: 83  Temp: 98.5  F (36.9  C)  Resp: 16  Height: 163.8 cm (5' 4.5\")  Weight: 88 kg (194 lb)  SpO2: 98 %      Physical Exam  Nursing note and vitals reviewed.  Constitutional: Cooperative.   HENT:   Mouth/Throat: Mucous membranes are normal.   Cardiovascular: Normal rate, regular rhythm and normal heart sounds.  No murmur.  Pulmonary/Chest: Effort normal and breath sounds normal. No respiratory distress. No wheezes. No rales.   Abdominal: Soft. Normal appearance. No distension. Mild left lower quadrant tenderness. There is no rigidity and no guarding.  "   Neurological: Alert. Oriented x4  Skin: Skin is warm and dry. No rash noted.   Psychiatric: Normal mood and affect.     Emergency Department Course     Imaging:  Radiographic findings were communicated with the patient who voiced understanding of the findings.    US OB less than 14 weeks:  Single live intrauterine pregnancy measuring 6 weeks 2 days gestational age.  Results per radiology    Laboratory:  CBC: WBC: 8.2, HGB: 13.1, PLT: 327  BMP: Glucose 136 (H), calcium: 8.4 (L), o/w WNL (Creatinine: 0.58)    HCG blood: 25477 (H)      Emergency Department Course:  Nursing notes and vitals reviewed.  0017: I performed an exam of the patient as documented above.     0218: I rechecked the patient.  Findings and plan explained to the Patient. Patient discharged home with instructions regarding supportive care, medications, and reasons to return. The importance of close follow-up was reviewed.     Impression & Plan      Medical Decision Making:  Rocío Castle is a 27 year old female who presents with symptoms consistent with previous pregnancies including nausea and lower abdominal pain. This did localize to the left lower quadrant and given her tubal ligation performed last November I was concerned for possible ectopic pregnancy though she did not have a surgical abdomen and her vital signs were reassuring. Ultrasound confirms a single intrauterine pregnancy dated 6 weeks 2 days. Basic labs are otherwise reassuring. Her blood type was confirmed to be O+. For her nausea, we discussed using Reglan and talking to her Obstetrician about possibly using Zofran as she has used in previous pregnancies. She will get herself started back on prenatal vitamins and knows the signs and symptoms to return to the ED or if she has any other concerns. No indication that there is a secondary acute surgical process at this time.     Diagnosis:    ICD-10-CM   1. Less than 8 weeks gestation of pregnancy Z3A.01   2. Vomiting or  nausea of pregnancy O21.9     Discharge Medications:  Discharge Medication List as of 6/30/2018  2:09 AM      START taking these medications    Details   metoclopramide (REGLAN) 5 MG tablet Take 1-2 tablets (5-10 mg) by mouth every 8 hours as needed, Disp-20 tablet, R-0, Local Print           I, Dino Radford, ronald serving as a scribe on 6/30/2018 at 12:17 AM to personally document services performed by Dr. Mcarthur  based on my observations and the provider's statements to me.       Dino Radford  6/29/2018   [facility] EMERGENCY DEPARTMENT       Yared Mcarthur MD  06/30/18 0430

## 2018-06-30 NOTE — ED AVS SNAPSHOT
[facility] Emergency Department    [address] [place] [address]    Phone:  [phone]    Fax:  [phone]                                       Rocío Castle   MRN: [id number]    Department:  [facility] Emergency Department   Date of Visit:  6/29/2018           Patient Information     Date Of Birth          1990        Your diagnoses for this visit were:     Less than 8 weeks gestation of pregnancy     Vomiting or nausea of pregnancy        You were seen by Yared Mcarthur MD.      Follow-up Information     Follow up with OBGYN this week.      Discharge References/Attachments     PREGNANCY, ADAPTING TO: [facility] (ENGLISH)      24 Hour Appointment Hotline       To make an appointment at any [facility] clinic, call [phone] ([phone]). If you don't have a family doctor or clinic, we will help you find one. [facility] clinics are conveniently located to serve the needs of you and your family.             Review of your medicines      START taking        Dose / Directions Last dose taken    metoclopramide 5 MG tablet   Commonly known as:  REGLAN   Dose:  5-10 mg   Quantity:  20 tablet        Take 1-2 tablets (5-10 mg) by mouth every 8 hours as needed   Refills:  0          Our records show that you are taking the medicines listed below. If these are incorrect, please call your family doctor or clinic.        Dose / Directions Last dose taken    CELEXA PO   Dose:  40 mg   Indication:  Aggressive Behavior        Take 40 mg by mouth every evening   Refills:  0        CYANOCOBALAMIN PO   Dose:  1000 mcg        Take 1,000 mcg by mouth daily   Refills:  0        lamoTRIgine 25 MG tablet   Commonly known as:  LaMICtal   Dose:  25 mg   Quantity:  40 tablet        Take 1 tablet (25 mg) by mouth daily for 10 days then increase to 2 tablets (50mg) on 1/13/18.   Refills:  0        VITAMIN C PO   Dose:  500 mg        Take 500 mg by mouth daily   Refills:   0                Prescriptions were sent or printed at these locations (1 Prescription)                   Other Prescriptions                Printed at Department/Unit printer (1 of 1)         metoclopramide (REGLAN) 5 MG tablet                Procedures and tests performed during your visit     Basic metabolic panel    CBC with platelets differential    HCG quantitative pregnancy    US OB less than 14 Weeks W Transvaginal      Orders Needing Specimen Collection     Ordered          06/30/18 0031  UA with Microscopic - STAT, Prio: STAT, Status: Sent     Scheduled Task Status   06/30/18 0030 [facility] Reminder: Open   Order Class:  PCU Collect                  Pending Results     Date and Time Order Name Status Description    6/30/2018 0031 US OB less than 14 Weeks W Transvaginal Preliminary             Pending Culture Results     No orders found from 6/28/2018 to 7/1/2018.            Pending Results Instructions     If you had any lab results that were not finalized at the time of your Discharge, you can call the ED Lab Result RN at [phone]. You will be contacted by this team for any positive Lab results or changes in treatment. The nurses are available 7 days a week from 10A to 6:30P.  You can leave a message 24 hours per day and they will return your call.        Test Results From Your Hospital Stay        6/30/2018 12:47 AM      Component Results     Component Value Ref Range & Units Status    WBC 8.2 4.0 - 11.0 10e9/L Final    RBC Count 4.30 3.8 - 5.2 10e12/L Final    Hemoglobin 13.1 11.7 - 15.7 g/dL Final    Hematocrit 39.2 35.0 - 47.0 % Final    MCV 91 78 - 100 fl Final    MCH 30.5 26.5 - 33.0 pg Final    MCHC 33.4 31.5 - 36.5 g/dL Final    RDW 13.1 10.0 - 15.0 % Final    Platelet Count 327 150 - 450 10e9/L Final    Diff Method Automated Method  Final    % Neutrophils 57.5 % Final    % Lymphocytes 32.6 % Final    % Monocytes 5.7 % Final    % Eosinophils 3.4 % Final    % Basophils 0.6 % Final    %  Immature Granulocytes 0.2 % Final    Nucleated RBCs 0 0 /100 Final    Absolute Neutrophil 4.7 1.6 - 8.3 10e9/L Final    Absolute Lymphocytes 2.7 0.8 - 5.3 10e9/L Final    Absolute Monocytes 0.5 0.0 - 1.3 10e9/L Final    Absolute Eosinophils 0.3 0.0 - 0.7 10e9/L Final    Absolute Basophils 0.1 0.0 - 0.2 10e9/L Final    Abs Immature Granulocytes 0.0 0 - 0.4 10e9/L Final    Absolute Nucleated RBC 0.0  Final         6/30/2018  1:07 AM      Component Results     Component Value Ref Range & Units Status    Sodium 139 133 - 144 mmol/L Final    Potassium 3.4 3.4 - 5.3 mmol/L Final    Chloride 107 94 - 109 mmol/L Final    Carbon Dioxide 24 20 - 32 mmol/L Final    Anion Gap 8 3 - 14 mmol/L Final    Glucose 136 (H) 70 - 99 mg/dL Final    Urea Nitrogen 10 7 - 30 mg/dL Final    Creatinine 0.58 0.52 - 1.04 mg/dL Final    GFR Estimate >90 >60 mL/min/1.7m2 Final    Non  GFR Calc    GFR Estimate If Black >90 >60 mL/min/1.7m2 Final    African American GFR Calc    Calcium 8.4 (L) 8.5 - 10.1 mg/dL Final         6/30/2018  1:24 AM      Component Results     Component Value Ref Range & Units Status    HCG Quantitative Serum 91284 (H) 0 - 5 IU/L Final    Specimen run with a dilution         6/30/2018  2:00 AM      Narrative     US OB LESS THAN 14 WEEKS WITH TRANSVAGINAL SINGLE  6/30/2018 1:53 AM    HISTORY: Pregnancy with pain or bleeding.     TECHNIQUE: Transabdominal and transvaginal imaging were performed.   Transvaginal imaging was performed to better evaluate the uterus and  gestational sac.     COMPARISON:  None.    FINDINGS:      Patient reported LMP: Unknown.  Estimated gestational age by reported LMP: Unknown.  Estimated gestational age by current ultrasound measurement: 6 weeks 2  days.  Estimated date of delivery based on this ultrasound: 2/21/2019.  Crown-rump length: 0.5 cm.   Embryonic cardiac activity: 112 bpm.   Yolk sac: Present.  Subchorionic hemorrhage: None.    Right ovary: Unremarkable.  Left ovary: 2  cysts measuring up to 2.2 cm..  Adnexal mass: None.  Free pelvic fluid: None.        Impression     IMPRESSION: Single live intrauterine pregnancy measuring 6 weeks 2  days gestational age.                  Clinical Quality Measure: Blood Pressure Screening     Your blood pressure was checked while you were in the emergency department today. The last reading we obtained was  BP: 127/75 . Please read the guidelines below about what these numbers mean and what you should do about them.  If your systolic blood pressure (the top number) is less than 120 and your diastolic blood pressure (the bottom number) is less than 80, then your blood pressure is normal. There is nothing more that you need to do about it.  If your systolic blood pressure (the top number) is 120-139 or your diastolic blood pressure (the bottom number) is 80-89, your blood pressure may be higher than it should be. You should have your blood pressure rechecked within a year by a primary care provider.  If your systolic blood pressure (the top number) is 140 or greater or your diastolic blood pressure (the bottom number) is 90 or greater, you may have high blood pressure. High blood pressure is treatable, but if left untreated over time it can put you at risk for heart attack, stroke, or kidney failure. You should have your blood pressure rechecked by a primary care provider within the next 4 weeks.  If your provider in the emergency department today gave you specific instructions to follow-up with your doctor or provider even sooner than that, you should follow that instruction and not wait for up to 4 weeks for your follow-up visit.        Thank you for choosing [facility]       Thank you for choosing [facility] for your care. Our goal is always to provide you with excellent care. Hearing back from our patients is one way we can continue to improve our services. Please take a few minutes to complete the written survey that you may receive in the mail  "after you visit with us. Thank you!        [employer] Information     [employer] lets you send messages to your doctor, view your test results, renew your prescriptions, schedule appointments and more. To sign up, go to [email or web address] . Click on \"Log in\" on the left side of the screen, which will take you to the Welcome page. Then click on \"Sign up Now\" on the right side of the page.     You will be asked to enter the access code listed below, as well as some personal information. Please follow the directions to create your username and password.     Your access code is: [email or web address]  Expires: 2018  2:09 AM     Your access code will  in 90 days. If you need help or a new code, please call your [facility] clinic or [phone].        Care EveryWhere ID     This is your Care EveryWhere ID. This could be used by other organizations to access your [facility] medical records  [id number]        Equal Access to Services     [facility] NORI : Hadii aad ku hadasho Soalexaali, waaxda luqadaha, qaybta kaalmada adesabrinayarenee, freddy rodriguez . So [facility] [phone].    ATENCIÓN: Si habla español, tiene a grider disposición servicios gratuitos de asistencia lingüística. Llame al [phone].    We comply with applicable federal civil rights laws and [place] laws. We do not discriminate on the basis of race, color, national origin, age, disability, sex, sexual orientation, or gender identity.            After Visit Summary       This is your record. Keep this with you and show to your community pharmacist(s) and doctor(s) at your next visit.                  "

## 2018-07-27 LAB
HBV SURFACE AG SERPL QL IA: NONREACTIVE
HIV 1+2 AB+HIV1 P24 AG SERPL QL IA: NONREACTIVE
RUBELLA ABY IGG: NORMAL

## 2019-01-18 LAB — GROUP B STREP PCR: NEGATIVE

## 2019-02-14 ENCOUNTER — HOSPITAL ENCOUNTER (INPATIENT)
Facility: CLINIC | Age: 29
LOS: 2 days | Discharge: HOME | End: 2019-02-16
Attending: OBSTETRICS & GYNECOLOGY | Admitting: OBSTETRICS & GYNECOLOGY
Payer: COMMERCIAL

## 2019-02-14 ENCOUNTER — ANESTHESIA (OUTPATIENT)
Dept: OBGYN | Facility: CLINIC | Age: 29
End: 2019-02-14
Payer: COMMERCIAL

## 2019-02-14 ENCOUNTER — ANESTHESIA EVENT (OUTPATIENT)
Dept: OBGYN | Facility: CLINIC | Age: 29
End: 2019-02-14
Payer: COMMERCIAL

## 2019-02-14 DIAGNOSIS — F33.2 SEVERE EPISODE OF RECURRENT MAJOR DEPRESSIVE DISORDER, WITHOUT PSYCHOTIC FEATURES (H): ICD-10-CM

## 2019-02-14 PROBLEM — O40.9XX0 POLYHYDRAMNIOS: Status: ACTIVE | Noted: 2019-02-14

## 2019-02-14 LAB
ABO + RH BLD: ABNORMAL
ABO + RH BLD: ABNORMAL
BLD GP AB SCN SERPL QL: ABNORMAL
BLD PROD TYP BPU: ABNORMAL
BLD PROD TYP BPU: NORMAL
BLD PROD TYP BPU: NORMAL
BLD UNIT ID BPU: 0
BLD UNIT ID BPU: 0
BLOOD BANK CMNT PATIENT-IMP: ABNORMAL
BLOOD PRODUCT CODE: NORMAL
BLOOD PRODUCT CODE: NORMAL
BPU ID: NORMAL
BPU ID: NORMAL
HGB BLD-MCNC: 11.2 G/DL (ref 11.7–15.7)
NUM BPU REQUESTED: 2
SPECIMEN EXP DATE BLD: ABNORMAL
T PALLIDUM AB SER QL: NONREACTIVE
TRANSFUSION STATUS PATIENT QL: NORMAL

## 2019-02-14 PROCEDURE — 25800030 ZZH RX IP 258 OP 636: Performed by: ANESTHESIOLOGY

## 2019-02-14 PROCEDURE — 12000000 ZZH R&B MED SURG/OB

## 2019-02-14 PROCEDURE — 25000128 H RX IP 250 OP 636: Performed by: OBSTETRICS & GYNECOLOGY

## 2019-02-14 PROCEDURE — 40000671 ZZH STATISTIC ANESTHESIA CASE

## 2019-02-14 PROCEDURE — 27110038 ZZH RX 271: Performed by: ANESTHESIOLOGY

## 2019-02-14 PROCEDURE — 0064U ANTB TP TOTAL&RPR IA QUAL: CPT | Performed by: OBSTETRICS & GYNECOLOGY

## 2019-02-14 PROCEDURE — 37000011 ZZH ANESTHESIA WARD SERVICE

## 2019-02-14 PROCEDURE — 25000128 H RX IP 250 OP 636: Performed by: ANESTHESIOLOGY

## 2019-02-14 PROCEDURE — 72200001 ZZH LABOR CARE VAGINAL DELIVERY SINGLE

## 2019-02-14 PROCEDURE — 86900 BLOOD TYPING SEROLOGIC ABO: CPT | Performed by: OBSTETRICS & GYNECOLOGY

## 2019-02-14 PROCEDURE — 36415 COLL VENOUS BLD VENIPUNCTURE: CPT | Performed by: OBSTETRICS & GYNECOLOGY

## 2019-02-14 PROCEDURE — 25000125 ZZHC RX 250: Performed by: ANESTHESIOLOGY

## 2019-02-14 PROCEDURE — 25000125 ZZHC RX 250: Performed by: OBSTETRICS & GYNECOLOGY

## 2019-02-14 PROCEDURE — 3E0R3BZ INTRODUCTION OF ANESTHETIC AGENT INTO SPINAL CANAL, PERCUTANEOUS APPROACH: ICD-10-PCS | Performed by: ANESTHESIOLOGY

## 2019-02-14 PROCEDURE — 86850 RBC ANTIBODY SCREEN: CPT | Performed by: OBSTETRICS & GYNECOLOGY

## 2019-02-14 PROCEDURE — 10907ZC DRAINAGE OF AMNIOTIC FLUID, THERAPEUTIC FROM PRODUCTS OF CONCEPTION, VIA NATURAL OR ARTIFICIAL OPENING: ICD-10-PCS | Performed by: OBSTETRICS & GYNECOLOGY

## 2019-02-14 PROCEDURE — 00HU33Z INSERTION OF INFUSION DEVICE INTO SPINAL CANAL, PERCUTANEOUS APPROACH: ICD-10-PCS | Performed by: ANESTHESIOLOGY

## 2019-02-14 PROCEDURE — 86901 BLOOD TYPING SEROLOGIC RH(D): CPT | Performed by: OBSTETRICS & GYNECOLOGY

## 2019-02-14 PROCEDURE — 85018 HEMOGLOBIN: CPT | Performed by: OBSTETRICS & GYNECOLOGY

## 2019-02-14 PROCEDURE — 0HQ9XZZ REPAIR PERINEUM SKIN, EXTERNAL APPROACH: ICD-10-PCS | Performed by: OBSTETRICS & GYNECOLOGY

## 2019-02-14 PROCEDURE — 25000132 ZZH RX MED GY IP 250 OP 250 PS 637: Performed by: OBSTETRICS & GYNECOLOGY

## 2019-02-14 PROCEDURE — 25800030 ZZH RX IP 258 OP 636: Performed by: OBSTETRICS & GYNECOLOGY

## 2019-02-14 PROCEDURE — 86922 COMPATIBILITY TEST ANTIGLOB: CPT | Performed by: OBSTETRICS & GYNECOLOGY

## 2019-02-14 RX ORDER — AMOXICILLIN 250 MG
1 CAPSULE ORAL 2 TIMES DAILY
Status: DISCONTINUED | OUTPATIENT
Start: 2019-02-14 | End: 2019-02-16 | Stop reason: HOSPADM

## 2019-02-14 RX ORDER — MISOPROSTOL 200 UG/1
800 TABLET ORAL
Status: DISCONTINUED | OUTPATIENT
Start: 2019-02-14 | End: 2019-02-14

## 2019-02-14 RX ORDER — BISACODYL 10 MG
10 SUPPOSITORY, RECTAL RECTAL DAILY PRN
Status: DISCONTINUED | OUTPATIENT
Start: 2019-02-16 | End: 2019-02-16 | Stop reason: HOSPADM

## 2019-02-14 RX ORDER — CITALOPRAM HYDROBROMIDE 20 MG/1
40 TABLET ORAL EVERY EVENING
Status: DISCONTINUED | OUTPATIENT
Start: 2019-02-14 | End: 2019-02-16 | Stop reason: HOSPADM

## 2019-02-14 RX ORDER — FENTANYL CITRATE 50 UG/ML
50-100 INJECTION, SOLUTION INTRAMUSCULAR; INTRAVENOUS
Status: DISCONTINUED | OUTPATIENT
Start: 2019-02-14 | End: 2019-02-14

## 2019-02-14 RX ORDER — SODIUM CHLORIDE, SODIUM LACTATE, POTASSIUM CHLORIDE, CALCIUM CHLORIDE 600; 310; 30; 20 MG/100ML; MG/100ML; MG/100ML; MG/100ML
INJECTION, SOLUTION INTRAVENOUS CONTINUOUS
Status: DISCONTINUED | OUTPATIENT
Start: 2019-02-14 | End: 2019-02-14

## 2019-02-14 RX ORDER — BUPIVACAINE HCL/0.9 % NACL/PF 0.125 %
PLASTIC BAG, INJECTION (ML) EPIDURAL CONTINUOUS
Status: DISCONTINUED | OUTPATIENT
Start: 2019-02-14 | End: 2019-02-14

## 2019-02-14 RX ORDER — BUPIVACAINE HYDROCHLORIDE 2.5 MG/ML
INJECTION, SOLUTION EPIDURAL; INFILTRATION; INTRACAUDAL PRN
Status: DISCONTINUED | OUTPATIENT
Start: 2019-02-14 | End: 2019-02-14

## 2019-02-14 RX ORDER — OXYTOCIN/0.9 % SODIUM CHLORIDE 30/500 ML
100 PLASTIC BAG, INJECTION (ML) INTRAVENOUS CONTINUOUS
Status: DISCONTINUED | OUTPATIENT
Start: 2019-02-14 | End: 2019-02-16 | Stop reason: HOSPADM

## 2019-02-14 RX ORDER — HYDROCORTISONE 2.5 %
CREAM (GRAM) TOPICAL 3 TIMES DAILY PRN
Status: DISCONTINUED | OUTPATIENT
Start: 2019-02-14 | End: 2019-02-16 | Stop reason: HOSPADM

## 2019-02-14 RX ORDER — CARBOPROST TROMETHAMINE 250 UG/ML
250 INJECTION, SOLUTION INTRAMUSCULAR
Status: DISCONTINUED | OUTPATIENT
Start: 2019-02-14 | End: 2019-02-16 | Stop reason: HOSPADM

## 2019-02-14 RX ORDER — NALOXONE HYDROCHLORIDE 0.4 MG/ML
.1-.4 INJECTION, SOLUTION INTRAMUSCULAR; INTRAVENOUS; SUBCUTANEOUS
Status: DISCONTINUED | OUTPATIENT
Start: 2019-02-14 | End: 2019-02-14

## 2019-02-14 RX ORDER — AMOXICILLIN 250 MG
2 CAPSULE ORAL 2 TIMES DAILY
Status: DISCONTINUED | OUTPATIENT
Start: 2019-02-14 | End: 2019-02-16 | Stop reason: HOSPADM

## 2019-02-14 RX ORDER — LIDOCAINE 40 MG/G
CREAM TOPICAL
Status: DISCONTINUED | OUTPATIENT
Start: 2019-02-14 | End: 2019-02-14

## 2019-02-14 RX ORDER — OXYTOCIN/0.9 % SODIUM CHLORIDE 30/500 ML
340 PLASTIC BAG, INJECTION (ML) INTRAVENOUS CONTINUOUS PRN
Status: DISCONTINUED | OUTPATIENT
Start: 2019-02-14 | End: 2019-02-16 | Stop reason: HOSPADM

## 2019-02-14 RX ORDER — METHYLERGONOVINE MALEATE 0.2 MG/ML
200 INJECTION INTRAVENOUS
Status: DISCONTINUED | OUTPATIENT
Start: 2019-02-14 | End: 2019-02-16 | Stop reason: HOSPADM

## 2019-02-14 RX ORDER — OXYCODONE AND ACETAMINOPHEN 5; 325 MG/1; MG/1
1 TABLET ORAL
Status: DISCONTINUED | OUTPATIENT
Start: 2019-02-14 | End: 2019-02-14

## 2019-02-14 RX ORDER — NALBUPHINE HYDROCHLORIDE 10 MG/ML
2.5-5 INJECTION, SOLUTION INTRAMUSCULAR; INTRAVENOUS; SUBCUTANEOUS EVERY 6 HOURS PRN
Status: DISCONTINUED | OUTPATIENT
Start: 2019-02-14 | End: 2019-02-14

## 2019-02-14 RX ORDER — LAMOTRIGINE 100 MG/1
50 TABLET ORAL DAILY
Status: DISCONTINUED | OUTPATIENT
Start: 2019-02-15 | End: 2019-02-15

## 2019-02-14 RX ORDER — EPHEDRINE SULFATE 50 MG/ML
5 INJECTION, SOLUTION INTRAMUSCULAR; INTRAVENOUS; SUBCUTANEOUS
Status: DISCONTINUED | OUTPATIENT
Start: 2019-02-14 | End: 2019-02-14

## 2019-02-14 RX ORDER — OXYTOCIN 10 [USP'U]/ML
10 INJECTION, SOLUTION INTRAMUSCULAR; INTRAVENOUS
Status: DISCONTINUED | OUTPATIENT
Start: 2019-02-14 | End: 2019-02-16 | Stop reason: HOSPADM

## 2019-02-14 RX ORDER — LAMOTRIGINE 100 MG/1
50 TABLET ORAL DAILY
Status: DISCONTINUED | OUTPATIENT
Start: 2019-02-14 | End: 2019-02-14

## 2019-02-14 RX ORDER — OXYTOCIN 10 [USP'U]/ML
10 INJECTION, SOLUTION INTRAMUSCULAR; INTRAVENOUS
Status: DISCONTINUED | OUTPATIENT
Start: 2019-02-14 | End: 2019-02-14

## 2019-02-14 RX ORDER — IBUPROFEN 800 MG/1
800 TABLET, FILM COATED ORAL
Status: DISCONTINUED | OUTPATIENT
Start: 2019-02-14 | End: 2019-02-14

## 2019-02-14 RX ORDER — OXYTOCIN/0.9 % SODIUM CHLORIDE 30/500 ML
100-340 PLASTIC BAG, INJECTION (ML) INTRAVENOUS CONTINUOUS PRN
Status: DISCONTINUED | OUTPATIENT
Start: 2019-02-14 | End: 2019-02-14

## 2019-02-14 RX ORDER — CARBOPROST TROMETHAMINE 250 UG/ML
250 INJECTION, SOLUTION INTRAMUSCULAR
Status: DISCONTINUED | OUTPATIENT
Start: 2019-02-14 | End: 2019-02-14

## 2019-02-14 RX ORDER — OXYTOCIN/0.9 % SODIUM CHLORIDE 30/500 ML
1-24 PLASTIC BAG, INJECTION (ML) INTRAVENOUS CONTINUOUS
Status: DISCONTINUED | OUTPATIENT
Start: 2019-02-14 | End: 2019-02-14

## 2019-02-14 RX ORDER — ONDANSETRON 2 MG/ML
4 INJECTION INTRAMUSCULAR; INTRAVENOUS EVERY 6 HOURS PRN
Status: DISCONTINUED | OUTPATIENT
Start: 2019-02-14 | End: 2019-02-14

## 2019-02-14 RX ORDER — MISOPROSTOL 200 UG/1
400 TABLET ORAL
Status: DISCONTINUED | OUTPATIENT
Start: 2019-02-14 | End: 2019-02-16 | Stop reason: HOSPADM

## 2019-02-14 RX ORDER — FENTANYL CITRATE 50 UG/ML
INJECTION, SOLUTION INTRAMUSCULAR; INTRAVENOUS PRN
Status: DISCONTINUED | OUTPATIENT
Start: 2019-02-14 | End: 2019-02-14

## 2019-02-14 RX ORDER — NALOXONE HYDROCHLORIDE 0.4 MG/ML
.1-.4 INJECTION, SOLUTION INTRAMUSCULAR; INTRAVENOUS; SUBCUTANEOUS
Status: DISCONTINUED | OUTPATIENT
Start: 2019-02-14 | End: 2019-02-16 | Stop reason: HOSPADM

## 2019-02-14 RX ORDER — LIDOCAINE HCL/EPINEPHRINE/PF 2%-1:200K
VIAL (ML) INJECTION PRN
Status: DISCONTINUED | OUTPATIENT
Start: 2019-02-14 | End: 2019-02-14

## 2019-02-14 RX ORDER — ACETAMINOPHEN 325 MG/1
650 TABLET ORAL EVERY 4 HOURS PRN
Status: DISCONTINUED | OUTPATIENT
Start: 2019-02-14 | End: 2019-02-14

## 2019-02-14 RX ORDER — TERBUTALINE SULFATE 1 MG/ML
0.25 INJECTION, SOLUTION SUBCUTANEOUS
Status: DISCONTINUED | OUTPATIENT
Start: 2019-02-14 | End: 2019-02-14

## 2019-02-14 RX ORDER — PROCHLORPERAZINE 25 MG
25 SUPPOSITORY, RECTAL RECTAL EVERY 12 HOURS PRN
Status: DISCONTINUED | OUTPATIENT
Start: 2019-02-14 | End: 2019-02-14

## 2019-02-14 RX ORDER — ACETAMINOPHEN 325 MG/1
650 TABLET ORAL EVERY 4 HOURS PRN
Status: DISCONTINUED | OUTPATIENT
Start: 2019-02-14 | End: 2019-02-16 | Stop reason: HOSPADM

## 2019-02-14 RX ORDER — IBUPROFEN 800 MG/1
800 TABLET, FILM COATED ORAL EVERY 6 HOURS PRN
Status: DISCONTINUED | OUTPATIENT
Start: 2019-02-14 | End: 2019-02-16 | Stop reason: HOSPADM

## 2019-02-14 RX ORDER — LANOLIN 100 %
OINTMENT (GRAM) TOPICAL
Status: DISCONTINUED | OUTPATIENT
Start: 2019-02-14 | End: 2019-02-16 | Stop reason: HOSPADM

## 2019-02-14 RX ORDER — METHYLERGONOVINE MALEATE 0.2 MG/ML
200 INJECTION INTRAVENOUS
Status: DISCONTINUED | OUTPATIENT
Start: 2019-02-14 | End: 2019-02-14

## 2019-02-14 RX ORDER — FENTANYL CITRATE 50 UG/ML
100 INJECTION, SOLUTION INTRAMUSCULAR; INTRAVENOUS ONCE
Status: DISCONTINUED | OUTPATIENT
Start: 2019-02-14 | End: 2019-02-14

## 2019-02-14 RX ADMIN — IBUPROFEN 800 MG: 800 TABLET, FILM COATED ORAL at 22:21

## 2019-02-14 RX ADMIN — FENTANYL CITRATE 100 MCG: 50 INJECTION, SOLUTION INTRAMUSCULAR; INTRAVENOUS at 20:55

## 2019-02-14 RX ADMIN — LIDOCAINE HYDROCHLORIDE 20 ML: 10 INJECTION, SOLUTION EPIDURAL; INFILTRATION; INTRACAUDAL; PERINEURAL at 21:41

## 2019-02-14 RX ADMIN — SODIUM CHLORIDE, POTASSIUM CHLORIDE, SODIUM LACTATE AND CALCIUM CHLORIDE: 600; 310; 30; 20 INJECTION, SOLUTION INTRAVENOUS at 11:01

## 2019-02-14 RX ADMIN — SODIUM CHLORIDE, POTASSIUM CHLORIDE, SODIUM LACTATE AND CALCIUM CHLORIDE: 600; 310; 30; 20 INJECTION, SOLUTION INTRAVENOUS at 15:35

## 2019-02-14 RX ADMIN — ACETAMINOPHEN 650 MG: 325 TABLET, FILM COATED ORAL at 20:19

## 2019-02-14 RX ADMIN — LIDOCAINE HYDROCHLORIDE,EPINEPHRINE BITARTRATE 5 ML: 20; .005 INJECTION, SOLUTION EPIDURAL; INFILTRATION; INTRACAUDAL; PERINEURAL at 19:01

## 2019-02-14 RX ADMIN — FENTANYL CITRATE 100 MCG: 50 INJECTION, SOLUTION INTRAMUSCULAR; INTRAVENOUS at 19:01

## 2019-02-14 RX ADMIN — Medication: at 11:01

## 2019-02-14 RX ADMIN — BUPIVACAINE HYDROCHLORIDE 5 ML: 2.5 INJECTION, SOLUTION EPIDURAL; INFILTRATION; INTRACAUDAL at 19:01

## 2019-02-14 RX ADMIN — SODIUM CHLORIDE, POTASSIUM CHLORIDE, SODIUM LACTATE AND CALCIUM CHLORIDE: 600; 310; 30; 20 INJECTION, SOLUTION INTRAVENOUS at 09:01

## 2019-02-14 RX ADMIN — FENTANYL CITRATE 100 MCG: 50 INJECTION, SOLUTION INTRAMUSCULAR; INTRAVENOUS at 15:22

## 2019-02-14 RX ADMIN — ONDANSETRON 4 MG: 2 INJECTION INTRAMUSCULAR; INTRAVENOUS at 20:19

## 2019-02-14 RX ADMIN — OXYTOCIN-SODIUM CHLORIDE 0.9% IV SOLN 30 UNIT/500ML 2 MILLI-UNITS/MIN: 30-0.9/5 SOLUTION at 17:19

## 2019-02-14 RX ADMIN — ONDANSETRON 4 MG: 2 INJECTION INTRAMUSCULAR; INTRAVENOUS at 12:11

## 2019-02-14 ASSESSMENT — ACTIVITIES OF DAILY LIVING (ADL)
TRANSFERRING: 0-->INDEPENDENT
TOILETING: 0-->INDEPENDENT
BATHING: 0-->INDEPENDENT
RETIRED_EATING: 0-->INDEPENDENT
SWALLOWING: 0-->SWALLOWS FOODS/LIQUIDS WITHOUT DIFFICULTY
AMBULATION: 0-->INDEPENDENT
RETIRED_COMMUNICATION: 0-->UNDERSTANDS/COMMUNICATES WITHOUT DIFFICULTY
DRESS: 0-->INDEPENDENT
COGNITION: 0 - NO COGNITION ISSUES REPORTED

## 2019-02-14 ASSESSMENT — COLUMBIA-SUICIDE SEVERITY RATING SCALE - C-SSRS
1. IN THE PAST MONTH, HAVE YOU WISHED YOU WERE DEAD OR WISHED YOU COULD GO TO SLEEP AND NOT WAKE UP?: NO
6. HAVE YOU EVER DONE ANYTHING, STARTED TO DO ANYTHING, OR PREPARED TO DO ANYTHING TO END YOUR LIFE?: NO
2. HAVE YOU ACTUALLY HAD ANY THOUGHTS OF KILLING YOURSELF SINCE LAST CONTACT?: NO
2. HAVE YOU ACTUALLY HAD ANY THOUGHTS OF KILLING YOURSELF IN THE PAST MONTH?: NO
6. HAVE YOU EVER DONE ANYTHING, STARTED TO DO ANYTHING, OR PREPARED TO DO ANYTHING TO END YOUR LIFE?: NO

## 2019-02-14 ASSESSMENT — MIFFLIN-ST. JEOR: SCORE: 1746.01

## 2019-02-14 NOTE — ANESTHESIA PREPROCEDURE EVALUATION
Anesthesia Pre-Procedure Evaluation    Patient: Rocío Castle   MRN: [id number] : 1990          Preoperative Diagnosis: * No surgery found *        Past Medical History:   Diagnosis Date     Anemia     IV Iron infusions     Depressive disorder      h/o Panic attacks      Obesity     s/p gastric bypass     Past Surgical History:   Procedure Laterality Date     DILATION AND CURETTAGE SUCTION WITH ULTRASOUND GUIDANCE N/A 2015    Procedure: DILATION AND CURETTAGE SUCTION WITH ULTRASOUND GUIDANCE;  Surgeon: Gui Heaton MD;  Location: [facility] OR     Gastric bypass surgery       LAPAROSCOPIC TUBAL LIGATION Bilateral 2017    Procedure: LAPAROSCOPIC TUBAL LIGATION;  LAPAROSCOPIC BILATERAL TUBAL LIGATION;  Surgeon: Darren Verdugo MD;  Location: [facility] OR     Anesthesia Evaluation       history and physical reviewed .             ROS/MED HX    ENT/Pulmonary:  - neg pulmonary ROS     Neurologic:       Cardiovascular:  - neg cardiovascular ROS       METS/Exercise Tolerance:     Hematologic:         Musculoskeletal:         GI/Hepatic:         Renal/Genitourinary:         Endo:         Psychiatric:         Infectious Disease:         Malignancy:         Other:                     neg OB ROS            Physical Exam      Airway   Mallampati: II  TM distance: > 3 FB  Neck ROM: full    Dental     Cardiovascular       Pulmonary             Lab Results   Component Value Date    WBC 8.2 2018    HGB 13.1 2018    HCT 39.2 2018     2018    CRP <2.9 2016     2018    POTASSIUM 3.4 2018    CHLORIDE 107 2018    CO2 24 2018    BUN 10 2018    CR 0.58 2018     (H) 2018    TYRELL 8.4 (L) 2018    MAG 1.6 2014    ALBUMIN 2.4 (L) 2016    PROTTOTAL 6.4 (L) 2016    ALT 18 2016    AST 13 2016    GGT 23 2010    ALKPHOS 61 2016    BILITOTAL 0.2 2016    LIPASE 126  "05/14/2016    PTT 33 01/27/2016    INR 0.99 01/27/2016    TSH 0.93 12/29/2017    HCG Negative 12/29/2017    HCGS Positive (A) 01/27/2016       Preop Vitals  BP Readings from Last 3 Encounters:   02/14/19 126/77   06/29/18 127/75   01/02/18 122/79    Pulse Readings from Last 3 Encounters:   06/29/18 83   01/02/18 108   09/03/17 69      Resp Readings from Last 3 Encounters:   02/14/19 16   06/29/18 16   01/02/18 15    SpO2 Readings from Last 3 Encounters:   06/29/18 98%   12/31/17 100%   11/21/17 98%      Temp Readings from Last 1 Encounters:   02/14/19 98  F (36.7  C) (Oral)    Ht Readings from Last 1 Encounters:   02/14/19 1.651 m (5' 5\")      Wt Readings from Last 1 Encounters:   02/14/19 102.5 kg (226 lb)    Estimated body mass index is 37.61 kg/m  as calculated from the following:    Height as of this encounter: 1.651 m (5' 5\").    Weight as of this encounter: 102.5 kg (226 lb).       Anesthesia Plan      History & Physical Review      ASA Status:  3 .  OB Epidural Asa: 3            Postoperative Care      Consents  Anesthetic plan, risks, benefits and alternatives discussed with:  Patient..                 Misael Cameron MD                    .  "

## 2019-02-14 NOTE — PROVIDER NOTIFICATION
02/14/19 1711   Provider Notification   Provider Name/Title Dr. Bartlett   Method of Notification At Bedside   MD at bedside. Updated on uterine activity and FHT's. SVE per MD, IUPC placed. Order received to start pitocin at 2 bebe-units and increase as able.

## 2019-02-14 NOTE — PROGRESS NOTES
[facility]  Labor Progress Note    S: Patient states she is doing well, this epidural is working very well.    O:   Patient Vitals for the past 4 hrs:   BP Temp Temp src Resp   19 1700 -- (P) 97.9  F (36.6  C) (P) Oral --   19 1640 108/52 -- -- --   19 1610 95/53 -- -- --   19 1600 115/65 98.1  F (36.7  C) Oral 16   19 1550 137/56 -- -- --   19 1545 113/58 -- -- --   19 1535 106/56 -- -- --   19 1530 100/58 -- -- --   19 1525 123/60 -- -- --   19 1520 131/74 97.6  F (36.4  C) Oral 16   19 1501 -- 98  F (36.7  C) Oral --   19 1400 -- 98  F (36.7  C) Oral --       SVE: 7/70/-2, swollen anterior lip    FHT: Baseline 145, moderate variability, accelerations present, early decelerations  Mosses: Contractions every 4-6 minutes     A/P: Rocío Castle is a 28 year old  at 39w1d by 7w5d US admitted for IOL secondary to polyhydramnios (although most recent ALMAS on  was 19.8 cm), anti-M antibodies, obesity, s/p gastric bypass, pregnancy conceived after tubal ligation, SHIRA, h/o child with craniosynostosis.     Labor:   - Admission SVE of 350/-3 with fetal head well applied to SVE.  S\p AROM for clear fluid at 0905.  Has been 7 cm for 2-3 hours,thus IUPC placed given EFM showed contractions q1-3. Will start pitocin augmentation and titrate as able.  - Pain: Epidural      FWB:   - Continous EFM, IUPC  - Category I FHT, non-reactive   - Patient amenable to C/S for fetal indications      Other:   - Anti-M antibodies; T&S ordered  - Polyhydramnios  - Rh positive, RI, placenta anterior, EFW 7#   - GBS negative   - Child with craniosynostosis (2nd); normal level II US and fetal ECHO   - H/o gastric bypass  - Obesity   - Pregnancy conceived after tubal ligation in 2017  - Iron deficiency anemia; T&S ordered  - Depression; SW consultation PP       Ivory Sams MD   Pager: [phone]   2019, 5:18 PM

## 2019-02-14 NOTE — H&P
[facility] Labor and Delivery History and Physical    Rocío Castle MRN# [id number]   Age: 28 year old YOB: 1990     Date of Admission:  2019         HPI:   Rocío Castle is a 28 year old  at 39w1d by 7w5d US admitted for IOL secondary to polyhydramnios and anti-M antibodies.    She denies any vaginal bleeding, leakage of fluid or changes in her vaginal discharge.  She denies any regular, painful contractions.  Good fetal movement.     Pregnancy c/b:   - Anti-M antibodies  - Polyhydramnios  - GBS negative   - Child with craniosynostosis (2nd); normal level II US and fetal ECHO   - H/o gastric bypass  - Obesity   - Pregnancy conceived after tubal ligation in 2017  - Iron deficiency anemia         Pregnancy history:     OBSTETRIC HISTORY:  Obstetric History       T2      L2     SAB2   TAB0   Ectopic0   Multiple0   Live Births2       # Outcome Date GA Lbr Edmund/2nd Weight Sex Delivery Anes PTL Lv   5 Current            4 Term 17 39w0d 06:10 / 01:33 3.84 kg (8 lb 7.5 oz) M Vag-Forceps EPI N BLANCO      Name: BINDU CASTLE      Complications: Failure to Progress in Second Stage,Right occiput posterior presentation of fetus, not applicable or unspecified fetus      Apgar1:  6                Apgar5: 7   3 Term 16 40w4d 08:32 / 00:49 3.39 kg (7 lb 7.6 oz) F Vag-Spont EPI, Local N BLANCO      Name: BINDU CASTLE      Apgar1:  8                Apgar5: 9   2 SAB            1 SAB                   EDC: Estimated Date of Delivery: 2019    Ultrasounds:  2019    INDICATIONS: Anti-M antibodies.  Increased BMI. Hx bariatric surgery.   Child w/ craniosynostosis. Polyhydramnios.     Cephalic presentation.  Reassuring BPP score of 8 of 8.   REACTIVE NST with baseline of 130 bpm, moderate variability, > 2   accelerations of at least 15 bpm lasting at least 15 seconds, no   decelerations; therefore total BPP score of 10 of  10.    Amniotic fluid volume is normal, with an ALMAS of 19.8 cm.(Polyhydramnios   has resolved)  Normal MCA peak systolic dopplers.     RECOMMEND:    Suggest delivery at ~ 39 wks in the setting of sensitization to M antigen.    Level II on 9/27/18  Impression     1) Intrauterine pregnancy at 19w1d weeks gestational age  2) None of the anomalies commonly detected by ultrasound were evident in   the detailed fetal anatomic survey described above.   3) Fetal biometry is consistent with gestation age.    4) The amniotic fluid volume appeared normal.  5) Normal fetal echocardiogram  6) Normal cervical length  7) No markers for aneuploidy seen  8) One soft marker seen, Choroid Plexus Cyst  (CPC)  9) Marginal placental cord insertion        Maternal Past Medical History:     Past Medical History:   Diagnosis Date     Anemia     IV Iron infusions     Depressive disorder      h/o Panic attacks      Obesity     s/p gastric bypass     Past Surgical History:   Procedure Laterality Date     DILATION AND CURETTAGE SUCTION WITH ULTRASOUND GUIDANCE N/A 5/19/2015    Procedure: DILATION AND CURETTAGE SUCTION WITH ULTRASOUND GUIDANCE;  Surgeon: Gui Heaton MD;  Location: [facility] OR     Gastric bypass surgery  2013     LAPAROSCOPIC TUBAL LIGATION Bilateral 11/21/2017    Procedure: LAPAROSCOPIC TUBAL LIGATION;  LAPAROSCOPIC BILATERAL TUBAL LIGATION;  Surgeon: Darren Verdugo MD;  Location: [facility] OR      Medications Prior to Admission   Medication Sig Dispense Refill Last Dose     Citalopram Hydrobromide (CELEXA PO) Take 40 mg by mouth every evening    2/13/2019 at Unknown time     Ascorbic Acid (VITAMIN C PO) Take 500 mg by mouth daily   Unknown at Unknown time     CYANOCOBALAMIN PO Take 1,000 mcg by mouth daily   Past Month at Unknown time     lamoTRIgine (LAMICTAL) 25 MG tablet Take 1 tablet (25 mg) by mouth daily for 10 days then increase to 2 tablets (50mg) on 1/13/18. 40 tablet 0 Unknown at Unknown time      "metoclopramide (REGLAN) 5 MG tablet Take 1-2 tablets (5-10 mg) by mouth every 8 hours as needed 20 tablet 0 Unknown at Unknown time           Family History:   family history is not on file.          Social History:     Social History     Tobacco Use     Smoking status: Never Smoker     Smokeless tobacco: Never Used   Substance Use Topics     Alcohol use: No     Comment: sometimes            Review of Systems:   The Review of Systems is negative other than noted in the HPI          Physical Exam:     Patient Vitals for the past 8 hrs:   BP Temp Temp src Resp Height Weight   19 0819 126/77 98  F (36.7  C) Oral 16 1.651 m (5' 5\") 102.5 kg (226 lb)     Gen: Pleasant, NAD   CV:  Regular rate and rhythm, no murmurs, rubs or gallops appreciated   Resp: Non-labored breathing.  Lungs clear to ausculation bilaterally   Abd: Obese, soft, non-tender and non-distended   Ext: Trace pedal edema bilaterally     Cervix: 3/50%/-3, posterior, medium, head well applied to cervix  Membranes: Intact  EFW: 7 lbs.  Presentation:Cephalic by BSUS    Fetal Heart Rate Tracing:   Baseline 140  Variability: Moderate  Accelerations: Not Present  Decelerations: None  Interpretation: non-reactive    Contractions: q 5 min per EFM        Assessment:   Rocío Castle is a 28 year old  at 39w1d by 7w5d US admitted for IOL secondary to polyhydramnios (although most recent ALMAS on  was 19.8 cm), anti-M antibodies, obesity, s/p gastric bypass, pregnancy conceived after tubal ligation, SHIRA, h/o child with craniosynostosis.         Plan:     Labor:   - Admission SVE of 3/50/-3 with fetal head well applied to SVE.  Given options of pitocin induction vs. AROM, patient elected to proceed with AROM, this was performed at 0905, notable for clear fluid.   Pitocin augmentation PRN.   - Pain: Desires epidural     FWB:   - Continous EFM   - Category I FHT, non-reactive   - Patient amenable to C/S for fetal indications     Other:   - Anti-M " antibodies; T&S ordered  - Polyhydramnios  - Rh positive, RI, placenta anterior, EFW 7#   - GBS negative   - Child with craniosynostosis (2nd); normal level II US and fetal ECHO   - H/o gastric bypass  - Obesity   - Pregnancy conceived after tubal ligation in 11/2017  - Iron deficiency anemia; T&S ordered  - Depression; SW consultation PP       Ivory Sams MD   Pager: [phone]   February 14, 2019

## 2019-02-14 NOTE — PROGRESS NOTES
Pt seen at bedside.  Has epidural in place.  Now C/O increased pain.  Level is at T10.  Pt given 8ml of 0.5% bupiv w/ 1:200,000 epi + 100 mcg Fent as re-bolus.  No complications.  Got good relief.  Start time:1516  End time:1528    Dieudonne Costa MD

## 2019-02-14 NOTE — ANESTHESIA PROCEDURE NOTES
Peripheral nerve/Neuraxial procedure note :  Pre-Procedure  Performed by   Referred by MANOHAR  Location:       .       Assessment/Narrative  .  .  . Comments:  Pre-Procedure  Performed by Misael Cameron MD  Location: OB.      PreAnesthestic Checklist: patient identified, IV checked, risks and benefits discussed, informed consent obtained, monitors and equipment checked, pre-op evaluation and at physician/surgeon's request.    Timeout   Correct Patient: Yes  Correct Procedure: Epidural catheter placement  Correct Site: Yes   Correct Position: Yes    Procedure Documentation  Procedure:   Epidural catheter block for Labor    Patient currently in labor and she and OBMD request a labor epidural to control her labor pains. Patient was interviewed and examined. Procedure and risks including but not limited to bleeding, infection, nerve injury, paralysis, PDPH, and inadequate block requiring intervention discussed with patient. Questions answered. This epidural is to be placed in anticipation of vaginal delivery.  She consents to the epidural procedure.  Time-out was performed.  I or my partners remain immediately available for management of any issues or complications and will monitor at appropriate intervals.  Procedure: Patient sitting. Betadine prep x 3. Sterile drape applied.  Mask and gloves used.  Lidocaine 1%  local infiltration at L 3-4.  17 G. Tuohy needle at L3-4 by loss of resistance into epidural space.  No CSF, paresthesia or blood. 1.5 % Lidocaine with 1:200,000 Epinephrine 5cc test dose. Epidural catheter inserted w/o resistance to 5 cm in epidural space. Then 0.125% bupivicaine 10 cc with NS 5 cc.  Aspiration negative for blood and CSF.   Negative for neuro change, paresthesia or symptoms of intravascular injection or intrathecal injection.  Infusion orders written and infusion of 0.125% bupivicaine 15cc per hour started.    Misael Cameron MD

## 2019-02-15 PROBLEM — O99.840 PREGNANCY COMPLICATED BY PREVIOUS GASTRIC BYPASS, ANTEPARTUM: Status: ACTIVE | Noted: 2019-02-15

## 2019-02-15 PROBLEM — D50.9 ANEMIA, IRON DEFICIENCY: Status: ACTIVE | Noted: 2019-02-15

## 2019-02-15 PROBLEM — O40.3XX0 POLYHYDRAMNIOS IN THIRD TRIMESTER: Status: ACTIVE | Noted: 2019-02-15

## 2019-02-15 PROBLEM — R76.8: Status: ACTIVE | Noted: 2019-02-15

## 2019-02-15 PROCEDURE — 25000132 ZZH RX MED GY IP 250 OP 250 PS 637: Performed by: OBSTETRICS & GYNECOLOGY

## 2019-02-15 PROCEDURE — 12000000 ZZH R&B MED SURG/OB

## 2019-02-15 RX ORDER — LAMOTRIGINE 25 MG/1
25 TABLET ORAL DAILY
Status: DISCONTINUED | OUTPATIENT
Start: 2019-02-16 | End: 2019-02-16 | Stop reason: HOSPADM

## 2019-02-15 RX ADMIN — ACETAMINOPHEN 650 MG: 325 TABLET, FILM COATED ORAL at 08:12

## 2019-02-15 RX ADMIN — IBUPROFEN 800 MG: 800 TABLET, FILM COATED ORAL at 11:06

## 2019-02-15 RX ADMIN — CITALOPRAM HYDROBROMIDE 40 MG: 20 TABLET ORAL at 21:34

## 2019-02-15 RX ADMIN — SENNOSIDES AND DOCUSATE SODIUM 1 TABLET: 8.6; 5 TABLET ORAL at 08:12

## 2019-02-15 RX ADMIN — SENNOSIDES AND DOCUSATE SODIUM 1 TABLET: 8.6; 5 TABLET ORAL at 21:37

## 2019-02-15 RX ADMIN — LAMOTRIGINE 50 MG: 25 TABLET ORAL at 09:27

## 2019-02-15 RX ADMIN — IBUPROFEN 800 MG: 800 TABLET, FILM COATED ORAL at 17:13

## 2019-02-15 RX ADMIN — IBUPROFEN 800 MG: 800 TABLET, FILM COATED ORAL at 04:10

## 2019-02-15 NOTE — ADDENDUM NOTE
Addendum  created 02/14/19 2132 by Yared Whiting MD    Intraprocedure Event edited, Intraprocedure Meds edited

## 2019-02-15 NOTE — PROGRESS NOTES
"Updated by RN regarding patient's ongoing discomfort and requests for C/S.  During admission this AM, patient states she always asks for C/S because she doesn't like the pain and \"is a baby\" but specifically states \"don't let me do it, unless it is needed of course.\"       Patient likely OP presentation given SVE (excess room posteriorly) and coupling of contractions.  Swollen anterior lip.     Review of FHT is category I, reactive with contractions every 3-5 minutes, MVU are not adequate.     Review of medications shows no increase in pitocin since 1840, currently only at 5 mu/min    Plan:   - Discussed with RN position changes, specifically seated upright.   - Titrate pitocin every 30 minutes, increasing by 2 mu/min until adequate MVUs  - Limited SVE as the patient has had almost hourly SVEs for the past 5-6 hours   - Encourage coping techniques     Ivroy Sams MD   Pager: [phone]   February 14, 2019, 8:21 PM   "

## 2019-02-15 NOTE — PROVIDER NOTIFICATION
02/14/19 1915   Provider Notification   Provider Name/Title Dr. Bartlett   Method of Notification Phone   Request Evaluate - Remote   Notification Reason SVE;Uterine Activity;Pain;Status Update   MD updated on cervix anterior lip and swollen, uterine activity-viewing strip remotely. MD states to try thrown position and continue increasing pitocin.

## 2019-02-15 NOTE — CONSULTS
[facility]  MATERNAL CHILD HEALTH   INITIAL PSYCHOSOCIAL ASSESSMENT     DATA:     Reason for Social Work Consult: SW acknowledged MD order for mh issues. Pt, Rocío, has hx of anxiety and depression.  Pt reports taking Celexa for over 10 years.     Presenting Information: Pt delivered male baby Karan Pryor yesterday. Pt alone in room with sleeping baby.    Living Situation: Pt said she lives with her , Hakeem Moran, and three children,  Karan, her 2.5 year old daughter and 1.5 year old son.     Family Constellation: Pt, three children and .  has two sons from previous relationships. The boys live with their mothers, but often are at Pt's house, which she said is great. The older boys get along very well with pt's children and are looking forward to baby joining family.     Social Support: Rocío said she has a large extended family and they all live nearby. Her mother and two sisters are great support to her. Her father recently passed away.     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 Her  works in [employer] at a [employer]. He works rotating shifts and plans to be home with her for a few days when she leaves the hospital tomorrow with baby.     Insurance: [employer]    Source of Financial Support: 's job    Mental Health History: Rocío said everyone in her family has anxiety and depression. Her mother is on medication. Rocío denies no suicidal ideations. She sees a doctor for her meds. She said she has not felt depressed since being on Celexa for 10 years. She was also on Lamictal and went off of it when she became pregnant. She said she did very well while off the meds, but she started Lamictal again today. She plans to see her doctor about the medication when she gets home from hospital. We went over signs and symptoms of anxiety and depression and she said she feels she knows what to look for  and be aware of and so does her . She said he also takes Lamictal.     History of Postpartum Mood Disorders: Rocío denies have postpartum with her other two children.    Chemical Health History:Rocío denies use of substances and alcohol.     Current Coping: Rocío said she sees her mother and sisters often and they plan to come and help with the baby.    Community Resources//Baby Supplies: Rocío said she and Hakeem are prepared to bring baby home. Hakeem will bring car seat tomorrow. Rocío said she does not need anything.     INTERVENTION:       SW completed chart review and collaborated with the multidisciplinary team.     Psychosocial Assessment     Reviewed Hospital and Community Resources     Assessed Chemical Health History     Assessed Mental Health History and Current Symptoms     Identified stressors, barriers and family concerns     Provided support and active empathetic listening and validation.    Provided psychoeducation on  mood and anxiety disorders, assessed for any current symptoms or history    Provided brochure Depression and Anxiety During and after Pregnancy.     ASSESSMENT:     Coping: good    Affect: appropriate, stable, calm    Mood:  appropriate,  stable, calm     Motivation/Ability to Access Services: seems motivated to seek out assistance if she felt the need    Assessment of Support System: stable, involved, appropriate, adequate    Level of engagement with SW: She appeared open to and appreciative of ongoing therapeutic support, advocacy, and connection with resources.   Engaged and appropriate. Able to seek out SW when needs arise.     Family and parent/infant interactions:  Baby was asleep in the room, did not witness interaction.    Strengths: caring family, willingness to accept help    Vulnerabilities: none at this time    Identified Barriers: None at this time     PLAN:     SW will continue to follow throughout pt's Maternal-Child Health Journey as  needs arise. SW will continue to collaborate with the multidisciplinary team.

## 2019-02-15 NOTE — L&D DELIVERY NOTE
Rocío Castle is a 28 year old  at 39w1d by 7w5d US admitted for IOL secondary to polyhydramnios (although most recent ALMAS on  was 19.8 cm), anti-M antibodies, obesity, s/p gastric bypass, pregnancy conceived after tubal ligation, SHIRA, h/o child with craniosynostosis. Given favorable SVE, induction was started with AROM at 0905, notable for clear fluid. The patient progressed to 7 cm at which time further augmentation with IV pitocin was started.  Given inability to adequately trace contractions, IUPC was placed and pitocin was titrated as able.  She was noted to be complete at .  She pushed effectively and delivered a viable male infant at  with APGARs of 9 and 9 respectively, LOP position.  Spontaneously delivery of an intact placenta with a 3-V cord ensued shortly thereafter.  She received 30 units of IV pitocin as a uterotonic agent.  Exam of the perineum revealed a first degree perineal laceration which was repaired in standard fashion using a 3-0 vicryl suture.  EBL 100cc, QBL pending.     Ivory Sams MD   Pager: [phone]   2019      Delivery Summary    Rocío Castle MRN# [id number]   Age: 28 year old YOB: 1990     Labor Event Times:    Labor Onset Date       Labor Onset Time    Dilation Complete Date    Dilation Complete Time       Start Pushing Date        Start Pushing Time            Labor Length:    1st Stage (hrs/min) 7.00 25.00   2nd Stage (hrs/min) 0.00 11.00   3rd Stage (hrs/min) 0.00 3.00       Labor Events:     Labor No   Rupture Date     Rupture Time     Rupture Type Artificial Rupture of Membranes   Fluid Color     Labor Type     Induction    Induction Indication         Augmentation    Labor Complications     Additional Complications     Management of Labor        Antibiotics     IV Antibiotic Given     Additional Management     Fetal Status Prior to  Delivery     Fetal Status Comments         Cervical Ripening:   "  Date     Time     Type         Delivery:    Episiotomy None   Local Anesthetic        Lacerations 1st   Sponge Count Correct       Needle Count Correct     Final Count by:    Sutures     Blood loss (ml)    Packing Intentionally Left In     Number     Comments           Information for the patient's :  Terry Castle  [[id number]]       Delivery  2019 9:36 PM by  Vaginal, Spontaneous  Sex:  male Gestational Age: 39w1d  Delivery Clinician:     Living?:            APGARS  One minute Five minutes Ten minutes   Skin color:            Heart rate:            Grimace:            Muscle tone:            Breathing:            Totals: 9  9         Presentation/position:           Resuscitation and Interventions: Method:  None  Oxygen Type:     Intubation Time:   # of Attempts:     ETT Size:        Tracheal Suction:     Tracheal returns:      Otley Care at Delivery:           Cord information:     Disposition of cord blood:      Blood gases sent?    Complications:     Placenta: Delivered:           appearance.  Comments: Nuchal x1.  Disposition: Hospital disposal   Measurements:  Weight: 7 lb 14.8 oz (3595 g)  Height: 20.25\"  Head circumference: 34.9 cm  Chest circumference:     Temperature:     Other providers:       Additional  information:  Forceps:    Verbal Informed Consent Obtained:       Alternative Labor Strategies Discussed:     Emergency Resources Available:       Type:       Accrued Pulling Time:       # of Pulls:      Position:     Fetal Station:       Indications:      Other Indications:     Operative Vaginal Delivery Brief Note Forceps:        Vacuum:    Verbal Informed Consent Obtained:     Alternative Labor Strategies Discussed:     Emergency Resources Available:     Type:      Accrued Pulling Time:       # of Pop-Offs:       # of Pulls:       Position:     Fetal Station:      Indications for Vacuum:       Other Indications:    Operative Vaginal Delivery Brief Note Vacuum:      "   Shoulder Dystocia Shoulder Dystocia    Fetal Tracing Prior to Delivery:  Category 2  Fetal Tracing Comments:  Variable decels  Shoulder dystocia present?:  No          Breech:       : Type:     Indications for Primary:     Indications for Secondary:     Other Indications:        Observed anomalies     Output in Delivery Room:

## 2019-02-15 NOTE — ADDENDUM NOTE
Addendum  created 02/14/19 1904 by Yared Whiting MD    Intraprocedure Event edited, Intraprocedure Meds edited

## 2019-02-15 NOTE — PROGRESS NOTES
[facility] Obstetrics Post-Partum Progress Note          Assessment and Plan:    Assessment:   Post-partum day #1  Normal spontaneous vaginal delivery  L&D complications: None    Pregnancy complicated by Anti-M Antibody, polyhydramnios, MDD, history of gastric bypass, obesity      Doing well.  No excessive bleeding  Pain well-controlled.  Breast feeding      Plan:   Ambulation encouraged  Lactation consultation  Pain control measures as needed  Routine postpartum care           Interval History:   Doing well.  Pain is adequately controlled.  No fevers.  No history of foul-smelling vaginal discharge.  Good appetite.  Denies chest pain, shortness of breath, nausea or vomiting.  Vaginal bleeding is similar to a heavy menstrual flow.  Breastfeeding well.          Significant Problems:      Patient Active Problem List    Diagnosis Date Noted     Polyhydramnios 02/14/2019     Priority: Medium     Major depression 12/29/2017     Priority: Medium     MENTAL HEALTH 05/27/2015     Priority: Medium     Depression with anxiety 02/05/2013     Priority: Medium             Review of Systems:    The patient denies any chest pain, shortness of breath, excessive pain, fever, chills, purulent drainage from the wound, nausea or vomiting.          Medications:     All medications related to the patient's delivery have been reviewed  Current Facility-Administered Medications   Medication     acetaminophen (TYLENOL) tablet 650 mg     [START ON 2/16/2019] bisacodyl (DULCOLAX) Suppository 10 mg     citalopram (celeXA) tablet 40 mg     hydrocortisone 2.5 % cream     ibuprofen (ADVIL/MOTRIN) tablet 800 mg     lamoTRIgine (LaMICtal) tablet 50 mg     lanolin ointment     No MMR Needed - Assessment: Patient does not need MMR vaccine     NO Rho (D) immune globulin (RhoGam) needed - mother Rh POSITIVE     No Tdap Needed - Assessment: Patient does not need Tdap vaccine     senna-docusate (SENOKOT-S/PERICOLACE) 8.6-50 MG per tablet 1 tablet     Or     senna-docusate (SENOKOT-S/PERICOLACE) 8.6-50 MG per tablet 2 tablet     [START ON 2/16/2019] sodium phosphate (FLEET ENEMA) 1 enema             Physical Exam:     All vitals stable  Patient Vitals for the past 12 hrs:   BP Temp Temp src Heart Rate Resp   02/15/19 0812 110/67 97.6  F (36.4  C) Oral 76 18   02/15/19 0410 108/67 -- -- -- 16   02/15/19 0030 119/64 -- -- -- --   02/15/19 0015 118/63 -- -- -- --   02/15/19 0000 121/64 -- -- -- --   02/14/19 2345 128/68 -- -- -- --   02/14/19 2330 123/61 -- -- -- --   02/14/19 2315 127/65 -- -- -- --   02/14/19 2300 123/68 97.5  F (36.4  C) Oral -- 16   02/14/19 2245 116/74 -- -- -- --   02/14/19 2214 130/61 -- -- -- --   02/14/19 2200 132/67 -- -- -- --   02/14/19 2144 124/57 -- -- -- --   02/14/19 2136 135/67 -- -- -- --   02/14/19 2124 -- 98.1  F (36.7  C) Oral -- --     Uterine fundus is firm, non-tender and at the level of the umbilicus          Data:     All laboratory data related to this surgery reviewed  Hemoglobin   Date Value Ref Range Status   02/14/2019 11.2 (L) 11.7 - 15.7 g/dL Final   06/30/2018 13.1 11.7 - 15.7 g/dL Final   12/29/2017 12.5 11.7 - 15.7 g/dL Final   09/02/2017 11.4 (L) 11.7 - 15.7 g/dL Final   08/28/2016 10.4 (L) 11.7 - 15.7 g/dL Final     No imaging studies have been ordered    Rose White MD

## 2019-02-16 VITALS
RESPIRATION RATE: 18 BRPM | HEIGHT: 65 IN | DIASTOLIC BLOOD PRESSURE: 72 MMHG | SYSTOLIC BLOOD PRESSURE: 122 MMHG | TEMPERATURE: 97.6 F | WEIGHT: 226 LBS | BODY MASS INDEX: 37.65 KG/M2

## 2019-02-16 PROCEDURE — 25000132 ZZH RX MED GY IP 250 OP 250 PS 637: Performed by: OBSTETRICS & GYNECOLOGY

## 2019-02-16 RX ORDER — LAMOTRIGINE 25 MG/1
25 TABLET ORAL DAILY
Qty: 30 TABLET | Refills: 0 | Status: SHIPPED | OUTPATIENT
Start: 2019-02-16 | End: 2019-03-18

## 2019-02-16 RX ORDER — AMOXICILLIN 250 MG
1 CAPSULE ORAL 2 TIMES DAILY
Qty: 60 TABLET | Refills: 0 | Status: SHIPPED | OUTPATIENT
Start: 2019-02-16 | End: 2019-05-31

## 2019-02-16 RX ADMIN — IBUPROFEN 800 MG: 800 TABLET, FILM COATED ORAL at 14:25

## 2019-02-16 RX ADMIN — LAMOTRIGINE 25 MG: 25 TABLET ORAL at 07:59

## 2019-02-16 RX ADMIN — IBUPROFEN 800 MG: 800 TABLET, FILM COATED ORAL at 07:59

## 2019-02-16 RX ADMIN — SENNOSIDES AND DOCUSATE SODIUM 1 TABLET: 8.6; 5 TABLET ORAL at 07:59

## 2019-02-16 NOTE — PROGRESS NOTES
"[facility]  PPD# 2    Pt doing well. Ambulating, voiding, tolerating PO. Decreased lochia. Pain controlled. She is in good mood today and states she is ready to go home but baby has been having some breathing \"issues\" and probably may need to stay one more night. She will stay as bed and board overnight. She is taking Lamictal as recommended and is not having any side effects.     Vitals:    02/15/19 0812 02/15/19 1754 19 0132 19 0801   BP: 110/67 102/66 110/62 122/72   Resp: 18 18 18 18   Temp: 97.6  F (36.4  C) 98.1  F (36.7  C) 97.6  F (36.4  C) 97.6  F (36.4  C)   TempSrc: Oral Oral Oral Oral   Weight:       Height:         Abd soft, nontender, nondistended, FFBU  Ext 1-2+ edema bilat LE, no CT    Results for ASUNCION MARCOS (MRN [id number]) as of 2019 12:30   Ref. Range 2019 00:00 2019 08:10 2019 08:10 2019 08:10 2019 08:10   Hemoglobin Latest Ref Range: 11.7 - 15.7 g/dL  11.2 (L)          A/P 28 year old  at 39w1d s/p  PPD# 2.     1) Postpartum   -Continue routine post-partum care.  - pt will bottle feed  - birth control; pt will have Bilateral salpingectomy between 6-8 wks PP  - pt will need 6 wk PP visit at which she should be scheduled for her laparoscopic procedure    2) MDD, SHIRA  - Lamictal started at 25 mg every day and will increase to 50 mg every day in 2 weeks    3) Others, anti-M antibodies  - no further work up or management at this point      -D/c home today.    Dr. Ruth Chaudhary  [phone]  2019 12:30 PM    "

## 2019-02-16 NOTE — DISCHARGE SUMMARY
[facility]    Discharge Summary  Obstetrics    Date of Admission:  2019  Date of Discharge:  2019  Discharging Provider: Lisa Chaudhary    Discharge Diagnoses   Patient Active Problem List   Diagnosis     Depression with anxiety     MENTAL HEALTH     Low forceps delivery, delivered, current hospitalization     Right occiput posterior presentation of fetus     Polyhydramnios, delivered, current hospitalization     Major depression     Polyhydramnios     Anti-M antibody positive     Pregnancy complicated by previous gastric bypass, antepartum     Polyhydramnios in third trimester     Anemia, iron deficiency         History of Present Illness   Rocío Castle is a 28 year old female now  who presented to L&D @ 39w1d GA. Her pregnancy has been complicated by polyhydramnios, anti-M antibodies, MDD, SHIRA, pregnancy conceived after tubal ligation, obesity with hx of gastric bypass. Please see her admit H&P for full details of her PMH, PSH, Meds, Allergies and exam on admit.    Hospital Course   The patient had a Normal spontaneous vaginal delivery @ 39w1d, please see her delivery summary for full details.     Her postpartum course was uncomplicated. On postpartum day 2, she was meeting all of her postpartum goals and deemed stable for discharge. She was voiding without difficulty, tolerating a regular diet without nausea and vomiting, her pain was well controlled on oral pain medicines and her lochia was appropriate.    Hgb:   Lab Results   Component Value Date    HGB 11.2 2019    HGB 13.1 2018       Lab Results   Component Value Date    RH Pos 2019    and rhogam was not given    Contraception was discussed and will be addressed at her postpartum appointment.    Instructions:  1) Call for temperature greater than 100.4F, foul smelling vaginal discharge, bleeding more than 1 pad per hour for 2 hrs, pain not controlled by oral pain meds, severe constipation or severe  nausea or vomiting.  2) She was instructed to follow-up with her primary OB in 6 weeks for a routine postpartum visit  3) She was instructed to continue her PNV on discharge if she wished to breast feed her infant.  4) Continue Lamictal as indicated, 25 mg every day for 10 days, and then increase to 50 mg every day  5) 6 wk PP visit in which she will plan laparoscopic bilateral salpingectomy for birth control definitive surgical method.     Lisa Chaudhary MD    Discharge Disposition   Discharged to home   Condition at discharge: Satisfactory    Primary Care Physician   Marleen Pop    Consultations This Hospital Stay   SOCIAL WORK IP CONSULT  ANESTHESIOLOGY IP CONSULT  HOME CARE POST PARTUM/ IP CONSULT  LACTATION IP CONSULT    Discharge Orders   No discharge procedures on file.  Discharge Medications   Current Discharge Medication List      START taking these medications    Details   senna-docusate (SENOKOT-S/PERICOLACE) 8.6-50 MG tablet Take 1 tablet by mouth 2 times daily  Qty: 60 tablet, Refills: 0    Associated Diagnoses: Low forceps delivery, delivered, current hospitalization         CONTINUE these medications which have NOT CHANGED    Details   Citalopram Hydrobromide (CELEXA PO) Take 40 mg by mouth every evening       Ascorbic Acid (VITAMIN C PO) Take 500 mg by mouth daily      lamoTRIgine (LAMICTAL) 25 MG tablet Take 1 tablet (25 mg) by mouth daily for 10 days then increase to 2 tablets (50mg) on 18.  Qty: 40 tablet, Refills: 0    Associated Diagnoses: Severe episode of recurrent major depressive disorder, without psychotic features (H)         STOP taking these medications       CYANOCOBALAMIN PO Comments:   Reason for Stopping:         metoclopramide (REGLAN) 5 MG tablet Comments:   Reason for Stopping:             Allergies   Allergies   Allergen Reactions     Blood Transfusion Related (Informational Only) Other (See Comments)     Patient has a history of a clinically significant antibody  against RBC antigens.  A delay in compatible RBCs may occur.     Paxil [Paroxetine Mesylate] Other (See Comments)     hallucination     Percocet [Oxycodone-Acetaminophen] Rash     Dizziness, rash, felt like she was going to stop breathing, vomiting, felt hot

## 2019-02-16 NOTE — DISCHARGE INSTRUCTIONS
Postpartum Vaginal Delivery Instructions  Follow up in 6 weeks for postpartum visit.    Activity       Ask family and friends for help when you need it.    Do not place anything in your vagina for 6 weeks.    You are not restricted on other activities, but take it easy for a few weeks to allow your body to recover from delivery.  You are able to do any activities you feel up to that point.    No driving until you have stopped taking your pain medications (usually two weeks after delivery).     Call your health care provider if you have any of these symptoms:       Increased pain, swelling, redness, or fluid around your stiches from an episiotomy or perineal tear.    A fever above 100.4 F (38 C) with or without chills when placing a thermometer under your tongue.    You soak a sanitary pad with blood within 1 hour, or you see blood clots larger than a golf ball.    Bleeding that lasts more than 6 weeks.    Vaginal discharge that smells bad.    Severe pain, cramping or tenderness in your lower belly area.    A need to urinate more frequently (use the toilet more often), more urgently (use the toilet very quickly), or it burns when you urinate.    Nausea and vomiting.    Redness, swelling or pain around a vein in your leg.    Problems breastfeeding or a red or painful area on your breast.    Chest pain and cough or are gasping for air.    Problems coping with sadness, anxiety, or depression.  If you have any concerns about hurting yourself or the baby, call your provider immediately.     You have questions or concerns after you return home.     Keep your hands clean:  Always wash your hands before touching your perineal area and stitches.  This helps reduce your risk of infection.  If your hands aren't dirty, you may use an alcohol hand-rub to clean your hands. Keep your nails clean and short.

## 2019-04-16 NOTE — ANESTHESIA POSTPROCEDURE EVALUATION
Patient: Rocío Castle    * No procedures listed *    Diagnosis:* No pre-op diagnosis entered *  Diagnosis Additional Information: Labor pain    Anesthesia Type:  Epidural    Note:  Anesthesia Post Evaluation    Patient location during evaluation: PACU  Patient participation: Able to fully participate in evaluation  Level of consciousness: awake  Pain management: adequate  Airway patency: patent  Cardiovascular status: acceptable  Respiratory status: acceptable  Hydration status: acceptable  PONV: controlled     Anesthetic complications: None    Comments: .Labor Epidural Post delivery note.      Doing well. VSS Temp normal. Satisfactory respiratory and cardiovascular function. Return of neurologic function. Questions encouraged and answered. Denies positional headache. Minimal side effects easily managed w/ PRN meds. No apparent anesthetic complications. No follow-up required.    I or my partner was immediately available for epidural management.    CARLOS Johns          Last vitals:  Vitals:    02/15/19 0015 02/15/19 0030 02/15/19 0410   BP: 118/63 119/64 108/67   Resp:   16   Temp:            Electronically Signed By: Paulo Johns DO  February 15, 2019  8:09 AM   [FreeTextEntry1] : Prolonged upper respiratory tract infection symptoms not responsive to CPAP. We will check the results of her sinus culture and initiate antibiotics if clinically warranted. Also discussed Medrol pack, and she reports she did take some type of steroid around the same time as the Z-Leonardo. Continue Astelin and Flonase for now, add Mucinex D and saline irrigations\par Followup 2 weeks, call sooner if not improving

## 2019-06-10 ASSESSMENT — MIFFLIN-ST. JEOR: SCORE: 1706.88

## 2019-06-11 ENCOUNTER — HOSPITAL ENCOUNTER (OUTPATIENT)
Facility: CLINIC | Age: 29
Discharge: HOME | End: 2019-06-11
Attending: OBSTETRICS & GYNECOLOGY | Admitting: OBSTETRICS & GYNECOLOGY
Payer: COMMERCIAL

## 2019-06-11 ENCOUNTER — ANESTHESIA (OUTPATIENT)
Dept: SURGERY | Facility: CLINIC | Age: 29
End: 2019-06-11
Payer: COMMERCIAL

## 2019-06-11 ENCOUNTER — ANESTHESIA EVENT (OUTPATIENT)
Dept: SURGERY | Facility: CLINIC | Age: 29
End: 2019-06-11
Payer: COMMERCIAL

## 2019-06-11 VITALS
DIASTOLIC BLOOD PRESSURE: 77 MMHG | OXYGEN SATURATION: 99 % | HEIGHT: 64 IN | TEMPERATURE: 98.1 F | SYSTOLIC BLOOD PRESSURE: 116 MMHG | HEART RATE: 71 BPM | BODY MASS INDEX: 37.56 KG/M2 | WEIGHT: 220 LBS | RESPIRATION RATE: 16 BRPM

## 2019-06-11 DIAGNOSIS — Z90.79 STATUS POST BILATERAL SALPINGECTOMY: Primary | ICD-10-CM

## 2019-06-11 LAB — HCG UR QL: NEGATIVE

## 2019-06-11 PROCEDURE — 88302 TISSUE EXAM BY PATHOLOGIST: CPT | Mod: 26 | Performed by: OBSTETRICS & GYNECOLOGY

## 2019-06-11 PROCEDURE — 40000305 ZZH STATISTIC PRE PROC ASSESS I: Performed by: OBSTETRICS & GYNECOLOGY

## 2019-06-11 PROCEDURE — 37000008 ZZH ANESTHESIA TECHNICAL FEE, 1ST 30 MIN: Performed by: OBSTETRICS & GYNECOLOGY

## 2019-06-11 PROCEDURE — 88342 IMHCHEM/IMCYTCHM 1ST ANTB: CPT | Performed by: OBSTETRICS & GYNECOLOGY

## 2019-06-11 PROCEDURE — 27210794 ZZH OR GENERAL SUPPLY STERILE: Performed by: OBSTETRICS & GYNECOLOGY

## 2019-06-11 PROCEDURE — 36000058 ZZH SURGERY LEVEL 3 EA 15 ADDTL MIN: Performed by: OBSTETRICS & GYNECOLOGY

## 2019-06-11 PROCEDURE — 88341 IMHCHEM/IMCYTCHM EA ADD ANTB: CPT | Mod: 26 | Performed by: OBSTETRICS & GYNECOLOGY

## 2019-06-11 PROCEDURE — 25000125 ZZHC RX 250: Performed by: NURSE ANESTHETIST, CERTIFIED REGISTERED

## 2019-06-11 PROCEDURE — 37000009 ZZH ANESTHESIA TECHNICAL FEE, EACH ADDTL 15 MIN: Performed by: OBSTETRICS & GYNECOLOGY

## 2019-06-11 PROCEDURE — 88341 IMHCHEM/IMCYTCHM EA ADD ANTB: CPT | Performed by: OBSTETRICS & GYNECOLOGY

## 2019-06-11 PROCEDURE — 71000012 ZZH RECOVERY PHASE 1 LEVEL 1 FIRST HR: Performed by: OBSTETRICS & GYNECOLOGY

## 2019-06-11 PROCEDURE — 25800030 ZZH RX IP 258 OP 636: Performed by: ANESTHESIOLOGY

## 2019-06-11 PROCEDURE — 93010 ELECTROCARDIOGRAM REPORT: CPT | Performed by: INTERNAL MEDICINE

## 2019-06-11 PROCEDURE — 25000125 ZZHC RX 250: Performed by: OBSTETRICS & GYNECOLOGY

## 2019-06-11 PROCEDURE — 81025 URINE PREGNANCY TEST: CPT | Performed by: OBSTETRICS & GYNECOLOGY

## 2019-06-11 PROCEDURE — 88302 TISSUE EXAM BY PATHOLOGIST: CPT | Performed by: OBSTETRICS & GYNECOLOGY

## 2019-06-11 PROCEDURE — 25000132 ZZH RX MED GY IP 250 OP 250 PS 637: Performed by: OBSTETRICS & GYNECOLOGY

## 2019-06-11 PROCEDURE — 71000027 ZZH RECOVERY PHASE 2 EACH 15 MINS: Performed by: OBSTETRICS & GYNECOLOGY

## 2019-06-11 PROCEDURE — 88342 IMHCHEM/IMCYTCHM 1ST ANTB: CPT | Mod: 26 | Performed by: OBSTETRICS & GYNECOLOGY

## 2019-06-11 PROCEDURE — 36000056 ZZH SURGERY LEVEL 3 1ST 30 MIN: Performed by: OBSTETRICS & GYNECOLOGY

## 2019-06-11 PROCEDURE — 25000128 H RX IP 250 OP 636: Performed by: NURSE ANESTHETIST, CERTIFIED REGISTERED

## 2019-06-11 RX ORDER — SODIUM CHLORIDE, SODIUM LACTATE, POTASSIUM CHLORIDE, CALCIUM CHLORIDE 600; 310; 30; 20 MG/100ML; MG/100ML; MG/100ML; MG/100ML
INJECTION, SOLUTION INTRAVENOUS CONTINUOUS
Status: DISCONTINUED | OUTPATIENT
Start: 2019-06-11 | End: 2019-06-11 | Stop reason: HOSPADM

## 2019-06-11 RX ORDER — BUPIVACAINE HYDROCHLORIDE AND EPINEPHRINE 5; 5 MG/ML; UG/ML
INJECTION, SOLUTION PERINEURAL PRN
Status: DISCONTINUED | OUTPATIENT
Start: 2019-06-11 | End: 2019-06-11 | Stop reason: HOSPADM

## 2019-06-11 RX ORDER — OXYCODONE HYDROCHLORIDE 5 MG/1
5-10 TABLET ORAL EVERY 6 HOURS PRN
Qty: 4 TABLET | Refills: 0 | Status: SHIPPED | OUTPATIENT
Start: 2019-06-11

## 2019-06-11 RX ORDER — ACETAMINOPHEN 325 MG/1
650 TABLET ORAL
Status: DISCONTINUED | OUTPATIENT
Start: 2019-06-11 | End: 2019-06-11 | Stop reason: HOSPADM

## 2019-06-11 RX ORDER — FENTANYL CITRATE 50 UG/ML
25-50 INJECTION, SOLUTION INTRAMUSCULAR; INTRAVENOUS
Status: DISCONTINUED | OUTPATIENT
Start: 2019-06-11 | End: 2019-06-11 | Stop reason: HOSPADM

## 2019-06-11 RX ORDER — KETOROLAC TROMETHAMINE 30 MG/ML
INJECTION, SOLUTION INTRAMUSCULAR; INTRAVENOUS PRN
Status: DISCONTINUED | OUTPATIENT
Start: 2019-06-11 | End: 2019-06-11

## 2019-06-11 RX ORDER — ONDANSETRON 4 MG/1
4 TABLET, ORALLY DISINTEGRATING ORAL EVERY 6 HOURS PRN
Status: DISCONTINUED | OUTPATIENT
Start: 2019-06-11 | End: 2019-06-11 | Stop reason: HOSPADM

## 2019-06-11 RX ORDER — GLYCOPYRROLATE 0.2 MG/ML
INJECTION, SOLUTION INTRAMUSCULAR; INTRAVENOUS PRN
Status: DISCONTINUED | OUTPATIENT
Start: 2019-06-11 | End: 2019-06-11

## 2019-06-11 RX ORDER — LIDOCAINE 40 MG/G
CREAM TOPICAL
Status: DISCONTINUED | OUTPATIENT
Start: 2019-06-11 | End: 2019-06-11 | Stop reason: HOSPADM

## 2019-06-11 RX ORDER — ONDANSETRON 2 MG/ML
INJECTION INTRAMUSCULAR; INTRAVENOUS PRN
Status: DISCONTINUED | OUTPATIENT
Start: 2019-06-11 | End: 2019-06-11

## 2019-06-11 RX ORDER — FENTANYL CITRATE 50 UG/ML
INJECTION, SOLUTION INTRAMUSCULAR; INTRAVENOUS PRN
Status: DISCONTINUED | OUTPATIENT
Start: 2019-06-11 | End: 2019-06-11

## 2019-06-11 RX ORDER — PROPOFOL 10 MG/ML
INJECTION, EMULSION INTRAVENOUS PRN
Status: DISCONTINUED | OUTPATIENT
Start: 2019-06-11 | End: 2019-06-11

## 2019-06-11 RX ORDER — LIDOCAINE HYDROCHLORIDE 10 MG/ML
INJECTION, SOLUTION INFILTRATION; PERINEURAL PRN
Status: DISCONTINUED | OUTPATIENT
Start: 2019-06-11 | End: 2019-06-11

## 2019-06-11 RX ORDER — OXYCODONE HYDROCHLORIDE 5 MG/1
5 TABLET ORAL
Status: DISCONTINUED | OUTPATIENT
Start: 2019-06-11 | End: 2019-06-11

## 2019-06-11 RX ORDER — ONDANSETRON 4 MG/1
4-8 TABLET, ORALLY DISINTEGRATING ORAL EVERY 8 HOURS PRN
Qty: 4 TABLET | Refills: 0 | Status: SHIPPED | OUTPATIENT
Start: 2019-06-11

## 2019-06-11 RX ORDER — ACETAMINOPHEN 325 MG/1
975 TABLET ORAL ONCE
Status: DISCONTINUED | OUTPATIENT
Start: 2019-06-11 | End: 2019-06-11 | Stop reason: HOSPADM

## 2019-06-11 RX ORDER — DEXAMETHASONE SODIUM PHOSPHATE 4 MG/ML
INJECTION, SOLUTION INTRA-ARTICULAR; INTRALESIONAL; INTRAMUSCULAR; INTRAVENOUS; SOFT TISSUE PRN
Status: DISCONTINUED | OUTPATIENT
Start: 2019-06-11 | End: 2019-06-11

## 2019-06-11 RX ORDER — AMOXICILLIN 250 MG
1-2 CAPSULE ORAL 2 TIMES DAILY
Qty: 15 TABLET | Refills: 0 | Status: SHIPPED | OUTPATIENT
Start: 2019-06-11

## 2019-06-11 RX ORDER — ONDANSETRON 2 MG/ML
4 INJECTION INTRAMUSCULAR; INTRAVENOUS EVERY 30 MIN PRN
Status: DISCONTINUED | OUTPATIENT
Start: 2019-06-11 | End: 2019-06-11 | Stop reason: HOSPADM

## 2019-06-11 RX ORDER — ONDANSETRON 2 MG/ML
4 INJECTION INTRAMUSCULAR; INTRAVENOUS EVERY 6 HOURS PRN
Status: DISCONTINUED | OUTPATIENT
Start: 2019-06-11 | End: 2019-06-11 | Stop reason: HOSPADM

## 2019-06-11 RX ORDER — NALOXONE HYDROCHLORIDE 0.4 MG/ML
.1-.4 INJECTION, SOLUTION INTRAMUSCULAR; INTRAVENOUS; SUBCUTANEOUS
Status: DISCONTINUED | OUTPATIENT
Start: 2019-06-11 | End: 2019-06-11 | Stop reason: HOSPADM

## 2019-06-11 RX ORDER — HYDROMORPHONE HYDROCHLORIDE 1 MG/ML
.3-.5 INJECTION, SOLUTION INTRAMUSCULAR; INTRAVENOUS; SUBCUTANEOUS EVERY 10 MIN PRN
Status: DISCONTINUED | OUTPATIENT
Start: 2019-06-11 | End: 2019-06-11 | Stop reason: HOSPADM

## 2019-06-11 RX ORDER — MEPERIDINE HYDROCHLORIDE 50 MG/ML
12.5 INJECTION INTRAMUSCULAR; INTRAVENOUS; SUBCUTANEOUS
Status: DISCONTINUED | OUTPATIENT
Start: 2019-06-11 | End: 2019-06-11 | Stop reason: HOSPADM

## 2019-06-11 RX ORDER — ONDANSETRON 4 MG/1
4 TABLET, ORALLY DISINTEGRATING ORAL EVERY 30 MIN PRN
Status: DISCONTINUED | OUTPATIENT
Start: 2019-06-11 | End: 2019-06-11 | Stop reason: HOSPADM

## 2019-06-11 RX ORDER — HYDROMORPHONE HYDROCHLORIDE 1 MG/ML
0.2 INJECTION, SOLUTION INTRAMUSCULAR; INTRAVENOUS; SUBCUTANEOUS
Status: DISCONTINUED | OUTPATIENT
Start: 2019-06-11 | End: 2019-06-11 | Stop reason: HOSPADM

## 2019-06-11 RX ORDER — PROPOFOL 10 MG/ML
INJECTION, EMULSION INTRAVENOUS CONTINUOUS PRN
Status: DISCONTINUED | OUTPATIENT
Start: 2019-06-11 | End: 2019-06-11

## 2019-06-11 RX ORDER — ACETAMINOPHEN 325 MG/1
650 TABLET ORAL EVERY 4 HOURS PRN
Qty: 30 TABLET | Refills: 0 | Status: SHIPPED | OUTPATIENT
Start: 2019-06-11

## 2019-06-11 RX ORDER — IBUPROFEN 600 MG/1
600 TABLET, FILM COATED ORAL EVERY 6 HOURS PRN
Qty: 30 TABLET | Refills: 0 | Status: SHIPPED | OUTPATIENT
Start: 2019-06-11

## 2019-06-11 RX ADMIN — MIDAZOLAM 2 MG: 1 INJECTION INTRAMUSCULAR; INTRAVENOUS at 07:41

## 2019-06-11 RX ADMIN — ACETAMINOPHEN 650 MG: 325 TABLET, FILM COATED ORAL at 10:49

## 2019-06-11 RX ADMIN — HYDROMORPHONE HYDROCHLORIDE 1 MG: 1 INJECTION, SOLUTION INTRAMUSCULAR; INTRAVENOUS; SUBCUTANEOUS at 08:23

## 2019-06-11 RX ADMIN — DEXAMETHASONE SODIUM PHOSPHATE 4 MG: 4 INJECTION, SOLUTION INTRA-ARTICULAR; INTRALESIONAL; INTRAMUSCULAR; INTRAVENOUS; SOFT TISSUE at 07:45

## 2019-06-11 RX ADMIN — SODIUM CHLORIDE, POTASSIUM CHLORIDE, SODIUM LACTATE AND CALCIUM CHLORIDE: 600; 310; 30; 20 INJECTION, SOLUTION INTRAVENOUS at 07:41

## 2019-06-11 RX ADMIN — LIDOCAINE HYDROCHLORIDE 30 MG: 10 INJECTION, SOLUTION INFILTRATION; PERINEURAL at 07:45

## 2019-06-11 RX ADMIN — FENTANYL CITRATE 100 MCG: 50 INJECTION, SOLUTION INTRAMUSCULAR; INTRAVENOUS at 07:45

## 2019-06-11 RX ADMIN — ONDANSETRON 4 MG: 2 INJECTION INTRAMUSCULAR; INTRAVENOUS at 08:05

## 2019-06-11 RX ADMIN — ROCURONIUM BROMIDE 40 MG: 10 INJECTION INTRAVENOUS at 07:45

## 2019-06-11 RX ADMIN — SODIUM CHLORIDE, POTASSIUM CHLORIDE, SODIUM LACTATE AND CALCIUM CHLORIDE: 600; 310; 30; 20 INJECTION, SOLUTION INTRAVENOUS at 08:56

## 2019-06-11 RX ADMIN — PROPOFOL 50 MCG/KG/MIN: 10 INJECTION, EMULSION INTRAVENOUS at 07:54

## 2019-06-11 RX ADMIN — PROPOFOL 200 MG: 10 INJECTION, EMULSION INTRAVENOUS at 07:45

## 2019-06-11 RX ADMIN — KETOROLAC TROMETHAMINE 30 MG: 30 INJECTION, SOLUTION INTRAMUSCULAR at 08:28

## 2019-06-11 RX ADMIN — GLYCOPYRROLATE 0.2 MG: 0.2 INJECTION, SOLUTION INTRAMUSCULAR; INTRAVENOUS at 07:45

## 2019-06-11 NOTE — ANESTHESIA PREPROCEDURE EVALUATION
Anesthesia Pre-Procedure Evaluation    Patient: Rocío Castle   MRN: [id number] : 1990          Preoperative Diagnosis: Elective    Procedure(s):  SALPINGECTOMY, LAPAROSCOPIC BILATERAL    Past Medical History:   Diagnosis Date     Anemia     IV Iron infusions     Depressive disorder      h/o Panic attacks      Obesity     s/p gastric bypass     Past Surgical History:   Procedure Laterality Date     DILATION AND CURETTAGE SUCTION WITH ULTRASOUND GUIDANCE N/A 2015    Procedure: DILATION AND CURETTAGE SUCTION WITH ULTRASOUND GUIDANCE;  Surgeon: Gui Heaton MD;  Location: [facility] OR     Gastric bypass surgery  2013     LAPAROSCOPIC TUBAL LIGATION Bilateral 2017    Procedure: LAPAROSCOPIC TUBAL LIGATION;  LAPAROSCOPIC BILATERAL TUBAL LIGATION;  Surgeon: Darren Verdugo MD;  Location: [facility] OR     Anesthesia Evaluation     . Pt has had prior anesthetic. Type: General    No history of anesthetic complications          ROS/MED HX    ENT/Pulmonary:  - neg pulmonary ROS     Neurologic:  - neg neurologic ROS     Cardiovascular:  - neg cardiovascular ROS       METS/Exercise Tolerance:     Hematologic:  - neg hematologic  ROS       Musculoskeletal:  - neg musculoskeletal ROS       GI/Hepatic:  - neg GI/hepatic ROS       Renal/Genitourinary:  - ROS Renal section negative       Endo:  - neg endo ROS   (+) Obesity, .      Psychiatric:     (+) psychiatric history anxiety and depression      Infectious Disease:  - neg infectious disease ROS       Malignancy:         Other:    - neg other ROS                      Physical Exam  Normal systems: cardiovascular, pulmonary and dental    Airway   Mallampati: II  TM distance: >3 FB  Neck ROM: full    Dental     Cardiovascular       Pulmonary             Lab Results   Component Value Date    WBC 8.2 2018    HGB 11.2 (L) 2019    HCT 39.2 2018     2018    CRP <2.9 2016     2018    POTASSIUM 3.4 2018  "   CHLORIDE 107 06/30/2018    CO2 24 06/30/2018    BUN 10 06/30/2018    CR 0.58 06/30/2018     (H) 06/30/2018    TYRELL 8.4 (L) 06/30/2018    MAG 1.6 01/29/2014    ALBUMIN 2.4 (L) 05/14/2016    PROTTOTAL 6.4 (L) 05/14/2016    ALT 18 05/14/2016    AST 13 05/14/2016    GGT 23 07/02/2010    ALKPHOS 61 05/14/2016    BILITOTAL 0.2 05/14/2016    LIPASE 126 05/14/2016    PTT 33 01/27/2016    INR 0.99 01/27/2016    TSH 0.93 12/29/2017    HCG Negative 12/29/2017    HCGS Positive (A) 01/27/2016       Preop Vitals  BP Readings from Last 3 Encounters:   06/11/19 116/87   02/16/19 122/72   06/29/18 127/75    Pulse Readings from Last 3 Encounters:   06/29/18 83   01/02/18 108   09/03/17 69      Resp Readings from Last 3 Encounters:   06/11/19 16   02/16/19 18   06/29/18 16    SpO2 Readings from Last 3 Encounters:   06/11/19 98%   06/29/18 98%   12/31/17 100%      Temp Readings from Last 1 Encounters:   06/11/19 98.9  F (37.2  C) (Temporal)    Ht Readings from Last 1 Encounters:   06/10/19 1.632 m (5' 4.25\")      Wt Readings from Last 1 Encounters:   06/10/19 99.8 kg (220 lb)    Estimated body mass index is 37.47 kg/m  as calculated from the following:    Height as of this encounter: 1.632 m (5' 4.25\").    Weight as of this encounter: 99.8 kg (220 lb).       Anesthesia Plan      History & Physical Review  History and physical reviewed and following examination; no interval change.    ASA Status:  2 .    NPO Status:  > 8 hours    Plan for General and ETT with Intravenous induction. Maintenance will be Balanced.    PONV prophylaxis:  Ondansetron (or other 5HT-3) and Dexamethasone or Solumedrol       Postoperative Care  Postoperative pain management:  IV analgesics, Oral pain medications and Multi-modal analgesia.      Consents  Anesthetic plan, risks, benefits and alternatives discussed with:  Patient..                 Bill White MD                    .  "

## 2019-06-11 NOTE — OR NURSING
Once awake in recovery, asked about Percocet allergy. Stated she took Oxycodone with last surgery and tolerated it with no issues.  Feels comfortable taking this post op for pain control.  Pharmacy updated and they will fill take home Rx.

## 2019-06-11 NOTE — ANESTHESIA POSTPROCEDURE EVALUATION
Patient: Rocío Castle    Procedure(s):  LAPAROSCOPIC BILATERAL SALPINGECTOMY    Diagnosis:Elective  Diagnosis Additional Information: No value filed.    Anesthesia Type:  General, ETT    Note:  Anesthesia Post Evaluation    Patient location during evaluation: PACU  Patient participation: Able to fully participate in evaluation  Level of consciousness: awake and alert  Pain management: adequate  Airway patency: patent  Cardiovascular status: acceptable  Respiratory status: acceptable  Hydration status: acceptable  PONV: none     Anesthetic complications: None          Last vitals:  Vitals:    06/11/19 1040 06/11/19 1152 06/11/19 1240   BP: 116/74 121/74 116/77   Pulse: 71     Resp: 16 16 16   Temp: 97.9  F (36.6  C)  98.1  F (36.7  C)   SpO2: 100% 100% 99%         Electronically Signed By: Bill White MD  June 11, 2019  1:03 PM

## 2019-06-11 NOTE — DISCHARGE INSTRUCTIONS
GENERAL ANESTHESIA OR SEDATION ADULT DISCHARGE INSTRUCTIONS   SPECIAL PRECAUTIONS FOR 24 HOURS AFTER SURGERY    IT IS NOT UNUSUAL TO FEEL LIGHT-HEADED OR FAINT, UP TO 24 HOURS AFTER SURGERY OR WHILE TAKING PAIN MEDICATION.  IF YOU HAVE THESE SYMPTOMS; SIT FOR A FEW MINUTES BEFORE STANDING AND HAVE SOMEONE ASSIST YOU WHEN YOU GET UP TO WALK OR USE THE BATHROOM.    YOU SHOULD REST AND RELAX FOR THE NEXT 24 HOURS AND YOU MUST MAKE ARRANGEMENTS TO HAVE SOMEONE STAY WITH YOU FOR AT LEAST 24 HOURS AFTER YOUR DISCHARGE.  AVOID HAZARDOUS AND STRENUOUS ACTIVITIES.  DO NOT MAKE IMPORTANT DECISIONS FOR 24 HOURS.    DO NOT DRIVE ANY VEHICLE OR OPERATE MECHANICAL EQUIPMENT FOR 24 HOURS FOLLOWING THE END OF YOUR SURGERY.  EVEN THOUGH YOU MAY FEEL NORMAL, YOUR REACTIONS MAY BE AFFECTED BY THE MEDICATION YOU HAVE RECEIVED.    DO NOT DRINK ALCOHOLIC BEVERAGES FOR 24 HOURS FOLLOWING YOUR SURGERY.    DRINK CLEAR LIQUIDS (APPLE JUICE, GINGER ALE, 7-UP, BROTH, ETC.).  PROGRESS TO YOUR REGULAR DIET AS YOU FEEL ABLE.    YOU MAY HAVE A DRY MOUTH, A SORE THROAT, MUSCLES ACHES OR TROUBLE SLEEPING.  THESE SHOULD GO AWAY AFTER 24 HOURS.    CALL YOUR DOCTOR FOR ANY OF THE FOLLOWING:  SIGNS OF INFECTION (FEVER, GROWING TENDERNESS AT THE SURGERY SITE, A LARGE AMOUNT OF DRAINAGE OR BLEEDING, SEVERE PAIN, FOUL-SMELLING DRAINAGE, REDNESS OR SWELLING.    IT HAS BEEN OVER 8 TO 10 HOURS SINCE SURGERY AND YOU ARE STILL NOT ABLE TO URINATE (PASS WATER).     LAPAROSCOPY DISCHARGE INSTRUCTIONS    PLEASE RETURN TO THE CLINIC IN:  ____1 WEEK  ____2 WEEKS  ____4 WEEKS  ____6 WEEKS  MAKE THIS APPOINTMENT AFTER YOU GET HOME IF IT HAS NOT ALREADY BEEN SCHEDULED.    DO NOT DRIVE A CAR, DRINK ALCOHOL OR USE MACHINERY FOR THE NEXT 24 HOURS.  YOU SHOULD WAIT UNTIL YOU HAVE RECOVERED BEFORE MAKING ANY IMPORTANT DECISIONS.    PAIN  YOU MAY HAVE CRAMPS, SHOULDER PAIN OR A LOW BACKACHE FOR 24 TO 48 HOURS.  TYLENOL (ACETAMINOPHEN) OR MOTRIN (IBUPROFEN) MAY HELP, OR YOUR  DOCTOR MAY GIVE YOU PAIN MEDICINE.  CALL YOUR DOCTOR IF PAIN CANNOT BE CONTROLLED.    BLEEDING OR VAGINAL DISCHARGE  YOU MAY HAVE SOME BLEEDING OR DISCHARGE FOR UP TO A WEEK OR LONGER.  DO NOT DOUCHE, USE TAMPONS OR HAVE SEX (INTERCOURSE) FOR ______DAYS.  CALL YOUR DOCTOR IF YOU SOAK MORE THAN ONE MAXI PAD (SANITARY NAPKIN) PER HOUR, OR IF YOU PASS LARGE BLOOD CLOTS.    FEVER  YOU MAY HAVE A LOW FEVER FOR THE FIRST TWO DAYS.  CALL YOUR DOCTOR IF IT GOES OVER 101 DEGREES.    NAUSEA  IF YOU HAVE NAUSEA (FEEL SICK TO YOUR STOMACH), STAY IN BED.  TRY DRINKING A SMALL AMOUNT OF 7-UP, TEA OR SOUP.    SWOLLEN BELLY  IF YOUR ABDOMEN (BELLY AREA) FEELS FIRM OR SWOLLEN, CALL YOUR DOCTOR.    DIZZINESS AND WEAKNESS  YOU MAY FEEL DIZZY OR WEAK FOR A FEW DAYS.  IF SO, YOU SHOULD REST OFTEN, STAND UP SLOWLY AND USE CARE WHEN CLIMBING STAIRS.    DIET AND ACTIVITY  EAT LIGHT MEALS AND DRINK PLENTY OF FLUIDS FOR THE FIRST 24 HOURS (OR LONGER, IF YOU HAVE NAUSEA).  WAIT 5 DAYS BEFORE BATHING.  SHOWERS ARE OKAY.  MOST WOMEN CAN RETURN TO WORK AFTER 24 HOURS.  YOU MAY GO BACK TO YOUR OTHER ACTIVITIES AFTER YOUR PAIN GOES AWAY.    IF YOU HAVE STITCHES  YOU MAY REMOVE YOUR BANDAGE THE DAY AFTER TREATMENT.  YOUR DOCTOR WILL TELL YOU IF YOUR STITCHES NEED TO BE REMOVED.  SOME STITCHES DISSOLVE OVER TIME.         You received Toradol, an IV form of ibuprofen (Motrin) at 8:30 am.  Do not take any ibuprofen products until 2:30 pm.    DR. NAZ DELACRUZ M.D.                  CLINIC PHONE NUMBER:  [phone]

## 2019-06-11 NOTE — ANESTHESIA CARE TRANSFER NOTE
Patient: Rocío Castle    Procedure(s):  LAPAROSCOPIC BILATERAL SALPINGECTOMY    Diagnosis: Elective  Diagnosis Additional Information: No value filed.    Anesthesia Type:   General, ETT     Note:  Airway :Face Mask  Patient transferred to:PACU  Handoff Report: Identifed the Patient, Identified the Reponsible Provider, Reviewed the pertinent medical history, Discussed the surgical course, Reviewed Intra-OP anesthesia mangement and issues during anesthesia, Set expectations for post-procedure period and Allowed opportunity for questions and acknowledgement of understanding      Vitals: (Last set prior to Anesthesia Care Transfer)    CRNA VITALS  6/11/2019 0809 - 6/11/2019 0845      6/11/2019             Pulse:  111    SpO2:  100 %    Resp Rate (observed):  5  (Abnormal)                 Electronically Signed By: JORDI Jorgensen CRNA  June 11, 2019  8:45 AM

## 2019-06-11 NOTE — OP NOTE
Operative Note  Rocío Castle  [id number]   1990     Preoperative Diagnosis:   - Desires permanent contraception  - Failed electrocautery laparoscopic sterilization in 2017 resulting in pregnancy and s/p  in 2019  - S/p gastric bypass surgery   - Obesity    Postoperative Diagnosis:   - Same, s/p bilateral salpingectomy     Procedure:   - Laparoscopic bilateral salpingectomy    Surgeon: Ivory Bartlett MD    Assistants:  Jami Abbott MD    Anesthesia: GETA with local (A total of 9cc 0.5% Marcaine with Epi)    Complications: None apparent    EBL: 5 mL  IV Fluids: 900 mL   UOP: 50 mL    Findings:   - No intraabdominal adhesions.  Normal survey of upper abdomen.   - Normal uterus, and ovaries.   - Right fallopian tube appears severed in isthmus portion.  Normal appearing fimbriated end.   - Left fallopian tube with very small amount of scaring in isthmus portion, however, overall appears grossly normal.    Specimen:  Bilateral fallopian tubes     Indications: Rocío Castle is a 28 year old female  who desires permanent sterilization. The patient is s/p  on 19. Pregnancy was conceived following electrocautery laparoscopic sterilization in 2017. The patient states she is CERTAIN that she does not want more children and her  is not wanting a vasectomy. She states she no she does not want to take any oral birth control pills given her history of gastric bypass surgery.  We discussed long term implantable contraception including Implanon and IUDs, which have failure rates less than 1% and sometimes beneficial side effects such as lighter or absent menstrual bleeding, with little or no surgical risk, and at less cost than surgical sterilization. Drawbacks include irregular bleeding and the need for replacement every 3-5 years. We again discussed male sterilization which is cheaper, safer and as effective as female sterilization, but with a 3 month waiting  period for efficacy.    We discussed female tubal sterilization by way of bilateral salpingectomy which has low rates of failure of about 1% at 5 years, is permanent, and immediately effective, but requires laparoscopy that has risks of bowel or major vascular injury. Overall, I did recommend to the patient Mirena IUD, however, she declines and strongly desires to proceed with surgery. FTP papers signed today. She will have pre-op clearance prior to her surgical date which will be >30 days from today.     The risks and benefits of the procedure were reviewed with the patient on the day of surgery. The patient elected to proceed. Informed consent was discussed and signed.  Of note, FTP were signed on 5/8/2019.    Procedure:  The patient was taken to the operating room where she was placed in the dorsal lithotomy position with feet in yellow fin stirrups. General endotracheal anesthesia was administered. An exam under anesthesia was performed. The patient was then prepped and draped in the usual sterile fashion. A sponge stick was placed into the vaginal as a uterine manipulator.  Munoz catheter was placed.   Attention was then turned to the abdomen. An 11-blade scalpel was used to make a 5 mm vertical incision through the umbilicus.  A Veress needle was used to access the peritoneum through the umbilical incision, and saline syringe returned no blood or stool and saline dripped into needle quickly. CO2 gas was attached to the needle and opening pressure was 3 mmHg, and flow was increased to 20 L/min. Pneumoperitoneum was achieved with good tympany of the abdomen.  A 5 mm trocar was used to place the first port. The 5 mm scope was placed in the port and visualized the abdomen which was free of any injury.  Two additional 5 mm ports were placed in the left and right lower quadrants under visualization.  The upper abdomen was visualized and photos were taken.  A blunt probe was used to manipulate the ovaries for  visualization of the entire pelvis. Photos were taken.  The Ligasure device was then used to serially clamp, cauterize and cut the mesosalpinx starting at the fimbria and finally amputating the right fallopian tube at the cornua.  The was repeated on the left.  A final visual sweep of the pelvis showed no further pathology. The ports were removed under direct visualization. The pneumoperitoneum was expelled.  Incisions were closed with Dermabond. The uterine manipulator/sponge stick was removed, as was the chicas.     Instrument, sponge, and needle counts were correct times two. The patient was extubated in the operating room and transferred to the PACU in stable condition.    Ivory Sams MD   Pager: [phone]   June 11, 2019

## 2019-06-13 LAB — INTERPRETATION ECG - MUSE: NORMAL

## 2019-06-18 LAB — COPATH REPORT: NORMAL
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
## 2021-02-08 NOTE — PLAN OF CARE
"Data: [facility] at 0800  Reason for maternal/fetal assessment per patient is Induction Of Labor  . Patient is a . Prenatal record reviewed.      Obstetric History       T2      L2     SAB2   TAB0   Ectopic0   Multiple0   Live Births2       # Outcome Date GA Lbr Edmund/2nd Weight Sex Delivery Anes PTL Lv   5 Current            4 Term 17 39w0d 06:10 / 01:33 3.84 kg (8 lb 7.5 oz) M Vag-Forceps EPI N BLANCO      Name: BINDU MARCOS      Complications: Failure to Progress in Second Stage,Right occiput posterior presentation of fetus, not applicable or unspecified fetus      Apgar1:  6                Apgar5: 7   3 Term 16 40w4d 08:32 / 00:49 3.39 kg (7 lb 7.6 oz) F Vag-Spont EPI, Local N BLANCO      Name: BINDU MARCSO      Apgar1:  8                Apgar5: 9   2 SAB            1 SAB                  Medical History:   Past Medical History:   Diagnosis Date     Anemia     IV Iron infusions     Depressive disorder      h/o Panic attacks      Obesity     s/p gastric bypass   . Gestational Age 39w1d. VSS. Cervix:   Fetal movement present. Patient denies cramping, backache, vaginal discharge, pelvic pressure, UTI symptoms, GI problems, bloody show, vaginal bleeding, edema, headache, visual disturbances, epigastric or URQ pain, abdominal pain, rupture of membranes. Support persons her  Hakeem is  present.  Action: KIMBERLYM/JENNIFFER on Triage assessment completed.  Response: Dr. DIAZ\"Jonathan here      "
12 chicas cath placed,SVE patient becoming more uncomfortable with contractions, rating at a 6,   12:15 Dr Johns up for re bolus to epidural  12:30 Dr Johns paged re bolus of epidural not working, patient uncomfortable  12:40 Epidural replaced  1305 patient resting pain at a 1  
Data: Pt up to BR and voided spontaneously.Eleonora care done by pt.  Rocío Castle transferred to [facility] via wheelchair at 0045. Baby transferred via parent's arms.  Action: Receiving unit notified of transfer: Yes. Patient and family notified of room change. Report given to Maci at 0055. Belongings sent to receiving unit. Accompanied by Registered Nurse. Oriented patient to surroundings. Call light within reach. ID bands double-checked with receiving RN.  Response: Patient tolerated transfer and is stable.  
Paged Dr. Thompson for discharge order x 3 and for order for Lamictal x 2 for at discharge. Discharge paperwork states that discharge meds have been sent to [facility] pharmacy: Senna and Lamictal. Writer will inform patient.  Patient is staying bed and board.  Family will need to go and . Writer reviewed all discharge paperwork with patient and all questions answered.  Patient is aware to follow up with OBGYN in 6 weeks and that she will need to follow up with her primary MD in order to continue with tapering up med increase of Lamictal. Current order reads: Lamictal 25mg po every day.  As of 1453 patient is now discharged, while infant is still inpatient.    
Patient meeting expected goals. Is up independent in room, meeting all personal and infant needs. Is formula feeding infant and bonding well.  VSS. Pain is being managed with Tylenol and Ibuprofen.  Discussed Lamictal restarting; on MAR. Patient aware and knows that this was to restart after deliver. Dose discussed. Patient also takes Celexa. SW consult will be placed due depression history; see sticky note by MD. Patient knows to call for any assistance needed.  
Patient meeting expected goals. Pain is being managed with Ibuprofen and Tylenol. VSS. Is formula feeding infant and bonding well.  Is up independent in room, meeting all personal and infant needs. Patient is stable and should be ready for discharge later today. Infant is having tests and may need to stay inpatient. Will discuss bed and board with this patient.    
VSS and pt meeting expected goals for the shift. Declines pain meds overnight. Formula feeding baby. Requested baby to be in NBN overnight so she could rest.  
VSS; patient is meeting expected goals for this evening. Voiding without difficulty and denies any clots. Managing pain with motrin and using tylenol as needed. Independent with self and infant cares. Formula feeding baby. Preparing for discharge to home tomorrow. No questions at this time.   
Visiting with mom and , is able to move legs and reposition pain level at a 2 not total relief but tolerable at the present time,  
Voiding without difficulty. Ambulating without difficulty. Managing self and baby cares, no support person overnight. Managing pain with ibuprofen per emar. Bonding well with baby.  
[email or web address]

## 2022-09-16 ENCOUNTER — ANCILLARY PROCEDURE (OUTPATIENT)
Dept: GENERAL RADIOLOGY | Facility: CLINIC | Age: 32
End: 2022-09-16
Attending: FAMILY MEDICINE
Payer: COMMERCIAL

## 2022-09-16 ENCOUNTER — OFFICE VISIT (OUTPATIENT)
Dept: URGENT CARE | Facility: URGENT CARE | Age: 32
End: 2022-09-16

## 2022-09-16 VITALS
DIASTOLIC BLOOD PRESSURE: 72 MMHG | SYSTOLIC BLOOD PRESSURE: 112 MMHG | BODY MASS INDEX: 30.11 KG/M2 | HEART RATE: 65 BPM | HEIGHT: 64 IN | OXYGEN SATURATION: 100 % | TEMPERATURE: 98.6 F | WEIGHT: 176.4 LBS | RESPIRATION RATE: 16 BRPM

## 2022-09-16 DIAGNOSIS — S69.92XA INJURY OF FINGER OF LEFT HAND, INITIAL ENCOUNTER: Primary | ICD-10-CM

## 2022-09-16 PROCEDURE — 99204 OFFICE O/P NEW MOD 45 MIN: CPT | Performed by: FAMILY MEDICINE

## 2022-09-16 PROCEDURE — 73140 X-RAY EXAM OF FINGER(S): CPT | Mod: TC | Performed by: RADIOLOGY

## 2022-09-16 NOTE — PROGRESS NOTES
"SUBJECTIVE:  Chief Complaint   Patient presents with     Hand Injury     Injuried left fingers. Some mild swelling and pain. Rated pain 6/10   .ident presents with a chief complaint of left finger  fourth, fifth.  The injury occurred 1 week ago.   The injury happened while at home.   How: trauma: moving furniture dropped on hand immediate pain  Past Medical History:   Diagnosis Date     Anemia     IV Iron infusions     Depressive disorder      h/o Panic attacks      Obesity     s/p gastric bypass     Allergies   Allergen Reactions     Blood Transfusion Related (Informational Only) Other (See Comments)     Patient has a history of a clinically significant antibody against RBC antigens.  A delay in compatible RBCs may occur.     Paxil [Paroxetine Mesylate] Other (See Comments)     hallucination     Percocet [Oxycodone-Acetaminophen] Rash     Dizziness, rash, felt like she was going to stop breathing, vomiting, felt hot     Social History     Tobacco Use     Smoking status: Never Smoker     Smokeless tobacco: Never Used   Substance Use Topics     Alcohol use: No     Comment: sometimes       ROSINTEGUMENTARY/SKIN: NEGATIVE for open wound/bleeding and NEGATIVE for bruising    EXAM: /72 (BP Location: Right arm, Patient Position: Sitting, Cuff Size: Adult Large)   Pulse 65   Temp 98.6  F (37  C) (Oral)   Resp 16   Ht 1.626 m (5' 4\")   Wt 80 kg (176 lb 6.4 oz)   SpO2 100%   BMI 30.28 kg/m  Gen: healthy,alert,no distress  Extremity: finger has pain with palpation and rom.   There is not compromise to the distal circulation.  Pulses are +2 and CRT is brisk  EXTREMITIES: peripheral pulses normal  SKIN: no suspicious lesions or rashes  NEURO: Normal strength and tone, sensory exam grossly normal, mentation intact and speech normal    Xray without acute findings, no fx read by Watson Quinn D.O.      ICD-10-CM    1. Injury of finger of left hand, initial encounter  S69.92XA XR Finger Left G/E 2 Views "     splint  RICE

## 2022-09-18 ENCOUNTER — OFFICE VISIT (OUTPATIENT)
Dept: URGENT CARE | Facility: URGENT CARE | Age: 32
End: 2022-09-18
Payer: COMMERCIAL

## 2022-09-18 VITALS
WEIGHT: 165 LBS | OXYGEN SATURATION: 99 % | SYSTOLIC BLOOD PRESSURE: 114 MMHG | TEMPERATURE: 98.9 F | HEART RATE: 70 BPM | DIASTOLIC BLOOD PRESSURE: 83 MMHG | BODY MASS INDEX: 28.32 KG/M2 | RESPIRATION RATE: 20 BRPM

## 2022-09-18 DIAGNOSIS — R07.0 THROAT PAIN: ICD-10-CM

## 2022-09-18 DIAGNOSIS — H10.33 ACUTE BACTERIAL CONJUNCTIVITIS OF BOTH EYES: Primary | ICD-10-CM

## 2022-09-18 DIAGNOSIS — R05.9 COUGH: ICD-10-CM

## 2022-09-18 DIAGNOSIS — J02.0 ACUTE STREPTOCOCCAL PHARYNGITIS: ICD-10-CM

## 2022-09-18 LAB
DEPRECATED S PYO AG THROAT QL EIA: POSITIVE
SARS-COV-2 RNA RESP QL NAA+PROBE: NEGATIVE

## 2022-09-18 PROCEDURE — U0005 INFEC AGEN DETEC AMPLI PROBE: HCPCS | Performed by: INTERNAL MEDICINE

## 2022-09-18 PROCEDURE — 87880 STREP A ASSAY W/OPTIC: CPT | Performed by: INTERNAL MEDICINE

## 2022-09-18 PROCEDURE — 99213 OFFICE O/P EST LOW 20 MIN: CPT | Mod: CS | Performed by: INTERNAL MEDICINE

## 2022-09-18 PROCEDURE — U0003 INFECTIOUS AGENT DETECTION BY NUCLEIC ACID (DNA OR RNA); SEVERE ACUTE RESPIRATORY SYNDROME CORONAVIRUS 2 (SARS-COV-2) (CORONAVIRUS DISEASE [COVID-19]), AMPLIFIED PROBE TECHNIQUE, MAKING USE OF HIGH THROUGHPUT TECHNOLOGIES AS DESCRIBED BY CMS-2020-01-R: HCPCS | Performed by: INTERNAL MEDICINE

## 2022-09-18 RX ORDER — POLYMYXIN B SULFATE AND TRIMETHOPRIM 1; 10000 MG/ML; [USP'U]/ML
1-2 SOLUTION OPHTHALMIC EVERY 4 HOURS
Qty: 10 ML | Refills: 0 | Status: SHIPPED | OUTPATIENT
Start: 2022-09-18 | End: 2022-09-18 | Stop reason: ALTCHOICE

## 2022-09-18 RX ORDER — AMOXICILLIN 500 MG/1
500 CAPSULE ORAL 2 TIMES DAILY
Qty: 20 CAPSULE | Refills: 0 | Status: SHIPPED | OUTPATIENT
Start: 2022-09-18 | End: 2022-09-28

## 2022-09-18 NOTE — PROGRESS NOTES
"  Assessment & Plan     (H10.33) Acute bacterial conjunctivitis of both eyes  (primary encounter diagnosis)  Plan: amoxicillin (AMOXIL) 500 MG capsule,         DISCONTINUED: trimethoprim-polymyxin b         (POLYTRIM) 21944-6.1 UNIT/ML-% ophthalmic         solution    (R05.9) Cough  Plan: Symptomatic; Unknown COVID-19 Virus         (Coronavirus) by PCR Nose    (R07.0) Throat pain  Plan: Streptococcus A Rapid Screen w/Reflex to PCR -         Clinic Collect    (J02.0) Acute streptococcal pharyngitis  Plan: amoxicillin (AMOXIL) 500 MG capsule    Niko Mccormick MD  [facility] Alford is a 31 year old accompanied by her child, presenting for the following health issues:  Pharyngitis (3 days), Cough (3 days), and Eye Problem (Both inf?-\"pink eye\")      HPI   Concern with possible pink eye.  Associated URI symptoms as well. Dry sore throat.  Now a couple of days with crusting and green drainage.     Review of Systems   Constitutional, HEENT, cardiovascular, pulmonary, gi and gu systems are negative, except as otherwise noted.      Objective    /83   Pulse 70   Temp 98.9  F (37.2  C)   Resp 20   Wt 74.8 kg (165 lb)   LMP  (LMP Unknown)   SpO2 99%   BMI 28.32 kg/m    Body mass index is 28.32 kg/m .  Physical Exam   GENERAL APPEARANCE: healthy, alert and no distress  EYES: bilateral green crusting with conjunctival injection  HENT: ear canals and TM's normal and nose and mouth without ulcers or lesions    Results for orders placed or performed in visit on 09/18/22 (from the past 24 hour(s))   Streptococcus A Rapid Screen w/Reflex to PCR - Clinic Collect    Specimen: Throat; Swab   Result Value Ref Range    Group A Strep antigen Positive (A) Negative                 "

## 2022-10-19 ENCOUNTER — OFFICE VISIT (OUTPATIENT)
Dept: URGENT CARE | Facility: URGENT CARE | Age: 32
End: 2022-10-19
Payer: COMMERCIAL

## 2022-10-19 VITALS
RESPIRATION RATE: 20 BRPM | BODY MASS INDEX: 28.32 KG/M2 | OXYGEN SATURATION: 98 % | WEIGHT: 165 LBS | DIASTOLIC BLOOD PRESSURE: 66 MMHG | SYSTOLIC BLOOD PRESSURE: 107 MMHG | HEART RATE: 80 BPM | TEMPERATURE: 98.7 F

## 2022-10-19 DIAGNOSIS — J02.0 STREP THROAT: Primary | ICD-10-CM

## 2022-10-19 PROCEDURE — U0005 INFEC AGEN DETEC AMPLI PROBE: HCPCS | Performed by: FAMILY MEDICINE

## 2022-10-19 PROCEDURE — 99213 OFFICE O/P EST LOW 20 MIN: CPT | Mod: CS | Performed by: FAMILY MEDICINE

## 2022-10-19 PROCEDURE — U0003 INFECTIOUS AGENT DETECTION BY NUCLEIC ACID (DNA OR RNA); SEVERE ACUTE RESPIRATORY SYNDROME CORONAVIRUS 2 (SARS-COV-2) (CORONAVIRUS DISEASE [COVID-19]), AMPLIFIED PROBE TECHNIQUE, MAKING USE OF HIGH THROUGHPUT TECHNOLOGIES AS DESCRIBED BY CMS-2020-01-R: HCPCS | Performed by: FAMILY MEDICINE

## 2022-10-19 RX ORDER — CEFDINIR 300 MG/1
300 CAPSULE ORAL 2 TIMES DAILY
Qty: 20 CAPSULE | Refills: 0 | Status: SHIPPED | OUTPATIENT
Start: 2022-10-19 | End: 2022-10-29

## 2022-10-19 NOTE — PROGRESS NOTES
SUBJECTIVE: Rocío Castle is a 32 year old female presenting with a chief complaint of sore throat.  Onset of symptoms was week(s) ago.  Course of illness is waxing and waning.    Current and Associated symptoms: none  Treatment measures tried include amox.  Predisposing factors include strep positive at start last month.    Past Medical History:   Diagnosis Date     Anemia     IV Iron infusions     Depressive disorder      h/o Panic attacks      Obesity     s/p gastric bypass     Allergies   Allergen Reactions     Blood Transfusion Related (Informational Only) Other (See Comments)     Patient has a history of a clinically significant antibody against RBC antigens.  A delay in compatible RBCs may occur.     Paxil [Paroxetine Mesylate] Other (See Comments)     hallucination     Percocet [Oxycodone-Acetaminophen] Rash     Dizziness, rash, felt like she was going to stop breathing, vomiting, felt hot     Social History     Tobacco Use     Smoking status: Never     Smokeless tobacco: Never   Substance Use Topics     Alcohol use: No     Comment: sometimes       ROS:  SKIN: no rash  GI: no vomiting    OBJECTIVE:  /66   Pulse 80   Temp 98.7  F (37.1  C)   Resp 20   Wt 74.8 kg (165 lb)   LMP  (LMP Unknown)   SpO2 98%   BMI 28.32 kg/m  GENERAL APPEARANCE: healthy, alert and no distress  EYES: EOMI,  PERRL, conjunctiva clear  HENT: tonsillar erythema, ears aclear  RESP: lungs clear to auscultation - no rales, rhonchi or wheezes  SKIN: no suspicious lesions or rashes      ICD-10-CM    1. Strep throat  J02.0 cefdinir (OMNICEF) 300 MG capsule     Symptomatic; Yes; 9/19/2022 COVID-19 Virus (Coronavirus) by PCR     Symptomatic; Yes; 9/19/2022 COVID-19 Virus (Coronavirus) by PCR Nose        Change toothbrush  Fluids/Rest, f/u if worse/not any better

## 2022-10-20 LAB — SARS-COV-2 RNA RESP QL NAA+PROBE: NEGATIVE

## 2023-11-18 ENCOUNTER — APPOINTMENT (OUTPATIENT)
Dept: GENERAL RADIOLOGY | Facility: CLINIC | Age: 33
End: 2023-11-18
Attending: EMERGENCY MEDICINE
Payer: COMMERCIAL

## 2023-11-18 ENCOUNTER — HOSPITAL ENCOUNTER (EMERGENCY)
Facility: CLINIC | Age: 33
Discharge: HOME | End: 2023-11-18
Attending: EMERGENCY MEDICINE | Admitting: EMERGENCY MEDICINE
Payer: COMMERCIAL

## 2023-11-18 VITALS
HEART RATE: 86 BPM | DIASTOLIC BLOOD PRESSURE: 93 MMHG | SYSTOLIC BLOOD PRESSURE: 146 MMHG | TEMPERATURE: 97.9 F | RESPIRATION RATE: 19 BRPM | OXYGEN SATURATION: 99 %

## 2023-11-18 DIAGNOSIS — R53.83 MALAISE AND FATIGUE: ICD-10-CM

## 2023-11-18 DIAGNOSIS — R53.81 MALAISE AND FATIGUE: ICD-10-CM

## 2023-11-18 DIAGNOSIS — R07.89 CHEST TIGHTNESS: ICD-10-CM

## 2023-11-18 LAB
ANION GAP SERPL CALCULATED.3IONS-SCNC: 10 MMOL/L (ref 7–15)
BASOPHILS # BLD AUTO: 0 10E3/UL (ref 0–0.2)
BASOPHILS NFR BLD AUTO: 1 %
BUN SERPL-MCNC: 12.8 MG/DL (ref 6–20)
CALCIUM SERPL-MCNC: 8.7 MG/DL (ref 8.6–10)
CHLORIDE SERPL-SCNC: 102 MMOL/L (ref 98–107)
CREAT SERPL-MCNC: 0.66 MG/DL (ref 0.51–0.95)
DEPRECATED HCO3 PLAS-SCNC: 25 MMOL/L (ref 22–29)
EGFRCR SERPLBLD CKD-EPI 2021: >90 ML/MIN/1.73M2
EOSINOPHIL # BLD AUTO: 0.1 10E3/UL (ref 0–0.7)
EOSINOPHIL NFR BLD AUTO: 1 %
ERYTHROCYTE [DISTWIDTH] IN BLOOD BY AUTOMATED COUNT: 12.8 % (ref 10–15)
FLUAV RNA SPEC QL NAA+PROBE: NEGATIVE
FLUBV RNA RESP QL NAA+PROBE: NEGATIVE
GLUCOSE SERPL-MCNC: 103 MG/DL (ref 70–99)
HCT VFR BLD AUTO: 37.8 % (ref 35–47)
HGB BLD-MCNC: 12.2 G/DL (ref 11.7–15.7)
HOLD SPECIMEN: NORMAL
HOLD SPECIMEN: NORMAL
IMM GRANULOCYTES # BLD: 0 10E3/UL
IMM GRANULOCYTES NFR BLD: 0 %
LYMPHOCYTES # BLD AUTO: 1 10E3/UL (ref 0.8–5.3)
LYMPHOCYTES NFR BLD AUTO: 20 %
MCH RBC QN AUTO: 28.9 PG (ref 26.5–33)
MCHC RBC AUTO-ENTMCNC: 32.3 G/DL (ref 31.5–36.5)
MCV RBC AUTO: 90 FL (ref 78–100)
MONOCYTES # BLD AUTO: 0.5 10E3/UL (ref 0–1.3)
MONOCYTES NFR BLD AUTO: 10 %
NEUTROPHILS # BLD AUTO: 3.4 10E3/UL (ref 1.6–8.3)
NEUTROPHILS NFR BLD AUTO: 68 %
NRBC # BLD AUTO: 0 10E3/UL
NRBC BLD AUTO-RTO: 0 /100
PLATELET # BLD AUTO: 321 10E3/UL (ref 150–450)
POTASSIUM SERPL-SCNC: 4.1 MMOL/L (ref 3.4–5.3)
RBC # BLD AUTO: 4.22 10E6/UL (ref 3.8–5.2)
RSV RNA SPEC NAA+PROBE: NEGATIVE
SARS-COV-2 RNA RESP QL NAA+PROBE: NEGATIVE
SODIUM SERPL-SCNC: 137 MMOL/L (ref 135–145)
TROPONIN T SERPL HS-MCNC: <6 NG/L
TSH SERPL DL<=0.005 MIU/L-ACNC: 1.25 UIU/ML (ref 0.3–4.2)
WBC # BLD AUTO: 5 10E3/UL (ref 4–11)

## 2023-11-18 PROCEDURE — 99285 EMERGENCY DEPT VISIT HI MDM: CPT | Mod: 25

## 2023-11-18 PROCEDURE — 87637 SARSCOV2&INF A&B&RSV AMP PRB: CPT | Performed by: EMERGENCY MEDICINE

## 2023-11-18 PROCEDURE — 93005 ELECTROCARDIOGRAM TRACING: CPT

## 2023-11-18 PROCEDURE — 84484 ASSAY OF TROPONIN QUANT: CPT | Performed by: EMERGENCY MEDICINE

## 2023-11-18 PROCEDURE — 80048 BASIC METABOLIC PNL TOTAL CA: CPT | Performed by: EMERGENCY MEDICINE

## 2023-11-18 PROCEDURE — 71046 X-RAY EXAM CHEST 2 VIEWS: CPT

## 2023-11-18 PROCEDURE — 84443 ASSAY THYROID STIM HORMONE: CPT | Performed by: EMERGENCY MEDICINE

## 2023-11-18 PROCEDURE — 36415 COLL VENOUS BLD VENIPUNCTURE: CPT | Performed by: EMERGENCY MEDICINE

## 2023-11-18 PROCEDURE — 85025 COMPLETE CBC W/AUTO DIFF WBC: CPT | Performed by: EMERGENCY MEDICINE

## 2023-11-18 ASSESSMENT — ACTIVITIES OF DAILY LIVING (ADL): ADLS_ACUITY_SCORE: 33

## 2023-11-18 NOTE — DISCHARGE INSTRUCTIONS
Discharge Instructions  Chest Pain    You have been seen today for chest pain or discomfort.  At this time, your doctor has found no signs that your chest pain is due to a serious or life-threatening condition, (or you have declined more testing and/or admission to the hospital). However, sometimes there is a serious problem that does not show up right away. Your evaluation today may not be complete and you may need further testing and evaluation. You need to follow-up with your regular doctor within 3 days.    Return to the Emergency Department if:    Your chest pain changes, gets worse, starts to happen more often, or comes with less activity.  You are short of breath.  You get very weak or tired.  You pass out or faint.  You have any new symptoms, like fever, cough, numb legs, or you cough up blood  You have anything else that worries you.    Until you follow-up with your regular doctor please do the following:    If you have questions, contact your regular doctor.    If your doctor today has told you to follow-up with your regular doctor, it is very important that you make an appointment with your clinic and go to the appointment.  If you do not follow-up with your primary doctor, it may result in missing an important development which could result in permanent injury or disability and/or lasting pain.  If there is any problem keeping your appointment, call your doctor or return to the Emergency Department.      Remember that you can always come back to the Emergency Department if you are not able to see your regular doctor in the amount of time listed above, if you get any new symptoms, or if there is anything that worries you.
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
## 2023-11-18 NOTE — ED TRIAGE NOTES
"Chest heaviness, headache and SOB since this morning.  VSS on RA.  Had to leave event due to anxiety over sensation. \"An elephant is sitting on my chest.\"        "

## 2023-11-20 LAB
ATRIAL RATE - MUSE: 83 BPM
DIASTOLIC BLOOD PRESSURE - MUSE: NORMAL MMHG
INTERPRETATION ECG - MUSE: NORMAL
P AXIS - MUSE: 57 DEGREES
PR INTERVAL - MUSE: 164 MS
QRS DURATION - MUSE: 90 MS
QT - MUSE: 366 MS
QTC - MUSE: 430 MS
R AXIS - MUSE: 31 DEGREES
SYSTOLIC BLOOD PRESSURE - MUSE: NORMAL MMHG
T AXIS - MUSE: 33 DEGREES
VENTRICULAR RATE- MUSE: 83 BPM

## 2024-01-02 ENCOUNTER — HOSPITAL ENCOUNTER (OUTPATIENT)
Dept: BEHAVIORAL HEALTH | Facility: CLINIC | Age: 34
Discharge: HOME | End: 2024-01-02
Attending: PSYCHIATRY & NEUROLOGY | Admitting: PSYCHIATRY & NEUROLOGY
Payer: COMMERCIAL

## 2024-01-02 VITALS — WEIGHT: 180 LBS | HEIGHT: 64 IN | BODY MASS INDEX: 30.73 KG/M2

## 2024-01-02 PROCEDURE — H0001 ALCOHOL AND/OR DRUG ASSESS: HCPCS

## 2024-01-02 ASSESSMENT — ANXIETY QUESTIONNAIRES
3. WORRYING TOO MUCH ABOUT DIFFERENT THINGS: NOT AT ALL
6. BECOMING EASILY ANNOYED OR IRRITABLE: NOT AT ALL
2. NOT BEING ABLE TO STOP OR CONTROL WORRYING: NOT AT ALL
7. FEELING AFRAID AS IF SOMETHING AWFUL MIGHT HAPPEN: NOT AT ALL
5. BEING SO RESTLESS THAT IT IS HARD TO SIT STILL: NOT AT ALL
GAD7 TOTAL SCORE: 0
GAD7 TOTAL SCORE: 0
4. TROUBLE RELAXING: NOT AT ALL
1. FEELING NERVOUS, ANXIOUS, OR ON EDGE: NOT AT ALL

## 2024-01-02 ASSESSMENT — COLUMBIA-SUICIDE SEVERITY RATING SCALE - C-SSRS
1. HAVE YOU WISHED YOU WERE DEAD OR WISHED YOU COULD GO TO SLEEP AND NOT WAKE UP?: NO
ATTEMPT LIFETIME: NO
TOTAL  NUMBER OF ABORTED OR SELF INTERRUPTED ATTEMPTS LIFETIME: NO
2. HAVE YOU ACTUALLY HAD ANY THOUGHTS OF KILLING YOURSELF?: NO
TOTAL  NUMBER OF INTERRUPTED ATTEMPTS LIFETIME: NO
6. HAVE YOU EVER DONE ANYTHING, STARTED TO DO ANYTHING, OR PREPARED TO DO ANYTHING TO END YOUR LIFE?: NO

## 2024-01-02 ASSESSMENT — PAIN SCALES - GENERAL: PAINLEVEL: NO PAIN (0)

## 2024-01-02 ASSESSMENT — PATIENT HEALTH QUESTIONNAIRE - PHQ9: SUM OF ALL RESPONSES TO PHQ QUESTIONS 1-9: 0
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
## 2024-01-02 NOTE — PROGRESS NOTES
"  [facility]  [facility] [place] [address]  1/2/2024    Rocío Castle  [address]  [facility] [address]      Rocío,    This is to verify that Rocío Castle (1990) participated in a substance use disorder assessment in person at [facility] in [place], [place] at [address] in the [facility] with ELIANA Peterson/LOBO at [facility] in [place], [place] on 1/2/2024.    Recommendations:  THE ADDENDUM FOR AN UPDATE OF THE BELOW RECOMMENDATIONS HAD BEEN COMPLETED ON 1/4/2024.  1.)  It was recommended the patient refrain from drinking alcohol and driving at all times.  2.)  Follow all the terms and conditions of her court probation, including participating in alcohol and drug screenings with court probation as directed.  3.)  Attend and complete a minimum of a 8-hour DWI class at the class which had been approved by her [facility]  on 1/4/2024.      4.)  In addition, if the patient chooses to continue to drink alcohol she should only drink alcohol in a minimal and social manner by not exceeding three \"standard\" alcoholic beverages (12 ounces of regular beer in the 5% alcohol range, 5 ounces of wine in the 12% alcohol range, or 1.5 ounces of distilled spirits in the 40% alcohol range) in any 24-hour period of time and by spreading her use of alcohol out over a long enough period of time given her gender and her body weight to avoid exceeding a 0.04 blood alcohol concentration.        The patient reported she was willing to follow the above recommendations.     For any additional issues or needs for follow-up regarding today's on 1/2/2024 substance use disorder assessment, please contact our patient navigator for assistance:   LOBO Paredes   Tel #: [phone]     If you have any additional questions or concerns, please feel free to contact me by any of the contact methods listed " below.    Sincerely,    ELIANA Davenport, [facility]  [place] [address]  [email or web address]  Tel: [phone]  Fax: [phone]

## 2024-01-04 NOTE — ADDENDUM NOTE
Encounter addended by: Joe Szymanski [facility] on: 1/4/2024 4:02 PM   Actions taken: Clinical Note Signed

## 2024-04-20 ENCOUNTER — OFFICE VISIT (OUTPATIENT)
Dept: URGENT CARE | Facility: URGENT CARE | Age: 34
End: 2024-04-20
Payer: COMMERCIAL

## 2024-04-20 VITALS
HEART RATE: 68 BPM | WEIGHT: 187 LBS | SYSTOLIC BLOOD PRESSURE: 127 MMHG | DIASTOLIC BLOOD PRESSURE: 80 MMHG | TEMPERATURE: 97.4 F | BODY MASS INDEX: 32.1 KG/M2 | OXYGEN SATURATION: 97 %

## 2024-04-20 DIAGNOSIS — R30.0 DYSURIA: Primary | ICD-10-CM

## 2024-04-20 LAB
ALBUMIN UR-MCNC: NEGATIVE MG/DL
APPEARANCE UR: CLEAR
BILIRUB UR QL STRIP: ABNORMAL
CLUE CELLS: ABNORMAL
COLOR UR AUTO: YELLOW
GLUCOSE UR STRIP-MCNC: NEGATIVE MG/DL
HCG UR QL: NEGATIVE
HGB UR QL STRIP: NEGATIVE
KETONES UR STRIP-MCNC: ABNORMAL MG/DL
LEUKOCYTE ESTERASE UR QL STRIP: NEGATIVE
NITRATE UR QL: NEGATIVE
PH UR STRIP: 6.5 [PH] (ref 5–7)
SP GR UR STRIP: 1.02 (ref 1–1.03)
TRICHOMONAS, WET PREP: ABNORMAL
UROBILINOGEN UR STRIP-ACNC: 1 E.U./DL
WBC'S/HIGH POWER FIELD, WET PREP: ABNORMAL
YEAST, WET PREP: ABNORMAL

## 2024-04-20 PROCEDURE — 87210 SMEAR WET MOUNT SALINE/INK: CPT | Performed by: FAMILY MEDICINE

## 2024-04-20 PROCEDURE — 99213 OFFICE O/P EST LOW 20 MIN: CPT | Performed by: FAMILY MEDICINE

## 2024-04-20 PROCEDURE — 87591 N.GONORRHOEAE DNA AMP PROB: CPT | Performed by: FAMILY MEDICINE

## 2024-04-20 PROCEDURE — 81003 URINALYSIS AUTO W/O SCOPE: CPT

## 2024-04-20 PROCEDURE — 81025 URINE PREGNANCY TEST: CPT

## 2024-04-20 PROCEDURE — 87086 URINE CULTURE/COLONY COUNT: CPT | Performed by: FAMILY MEDICINE

## 2024-04-20 PROCEDURE — 87491 CHLMYD TRACH DNA AMP PROBE: CPT | Performed by: FAMILY MEDICINE

## 2024-04-20 NOTE — PROGRESS NOTES
Chief Complaint   Patient presents with    UTI     Pain while urinating x5 days wants to rule out pregnancy and uti       Rocío was seen today for uti.    Diagnoses and all orders for this visit:    Dysuria  -     UA Macroscopic with reflex to Microscopic and Culture - Clinic Collect  -     Wet prep - Clinic Collect  -     HCG qualitative urine; Future  -     HCG qualitative urine  -     Urine Culture Aerobic Bacterial - lab collect; Future  -     Chlamydia trachomatis/Neisseria gonorrhoeae by PCR - Clinic Collect      ASSESSMENT:   Lower, uncomplicated urinary tract infection.    PLAN:  As per ordered above  Drink plenty of fluids.  Prevention and treatment of UTI's discussed.Signs and symptoms of pyelonephritis mentioned.  Follow up with primary care physician if not improving  Reviewed result   As no uti noted will get UC for presenting symptoms       Chelo Salazar MD     Results for orders placed or performed in visit on 04/20/24   UA Macroscopic with reflex to Microscopic and Culture - Clinic Collect     Status: Abnormal    Specimen: Urine, NOS   Result Value Ref Range    Color Urine Yellow Colorless, Straw, Light Yellow, Yellow    Appearance Urine Clear Clear    Glucose Urine Negative Negative mg/dL    Bilirubin Urine Small (A) Negative    Ketones Urine Trace (A) Negative mg/dL    Specific Gravity Urine 1.025 1.003 - 1.035    Blood Urine Negative Negative    pH Urine 6.5 5.0 - 7.0    Protein Albumin Urine Negative Negative mg/dL    Urobilinogen Urine 1.0 0.2, 1.0 E.U./dL    Nitrite Urine Negative Negative    Leukocyte Esterase Urine Negative Negative    Narrative    Microscopic not indicated   HCG qualitative urine     Status: Normal   Result Value Ref Range    hCG Urine Qualitative Negative Negative   Wet prep - Clinic Collect     Status: Abnormal    Specimen: Vagina; Swab   Result Value Ref Range    Trichomonas Absent Absent    Yeast Absent Absent    Clue Cells Absent Absent    WBCs/high power field 2+ (A)  None       SUBJECTIVE:   Rocío Castle is a 33 year old female who  presents today for a possible UTI. Symptoms of frequency and suprapubic pain and pressure have been going on for 4day(s).  Hematuria no.  sudden onsetand mild.  There is no history of fever, chills, nausea or vomiting.  No history of vaginal  discharge. This patient does not  have a history of urinary tract infections. Patient denies rigors, flank pain, and temperature > 101 degrees F. or vaginal discharge     Past Medical History:   Diagnosis Date    Anemia     IV Iron infusions    Borderline personality disorder (H)     Depressive disorder     h/o Panic attacks     History of panic attacks     Obesity     s/p gastric bypass     Current Outpatient Medications   Medication Sig Dispense Refill    Citalopram Hydrobromide (CELEXA PO) Take 40 mg by mouth every evening       lamoTRIgine (LAMICTAL) 25 MG tablet Take 1 tablet (25 mg) by mouth daily for 10 days then increase to 2 tablets (50mg) on 1/13/18. 40 tablet 0    acetaminophen (TYLENOL) 325 MG tablet Take 2 tablets (650 mg) by mouth every 4 hours as needed for mild pain (Patient not taking: Reported on 1/2/2024) 30 tablet 0    ibuprofen (ADVIL/MOTRIN) 600 MG tablet Take 1 tablet (600 mg) by mouth every 6 hours as needed for other (mild and/or inflammatory pain) (Patient not taking: Reported on 1/2/2024) 30 tablet 0    ondansetron (ZOFRAN-ODT) 4 MG ODT tab Take 1-2 tablets (4-8 mg) by mouth every 8 hours as needed for nausea (Patient not taking: Reported on 1/2/2024) 4 tablet 0    oxyCODONE (ROXICODONE) 5 MG tablet Take 1-2 tablets (5-10 mg) by mouth every 6 hours as needed for moderate to severe pain (Patient not taking: Reported on 1/2/2024) 4 tablet 0    senna-docusate (SENOKOT-S/PERICOLACE) 8.6-50 MG tablet Take 1-2 tablets by mouth 2 times daily (Patient not taking: Reported on 1/2/2024) 15 tablet 0     Social History     Tobacco Use    Smoking status: Never    Smokeless tobacco: Never    Substance Use Topics    Alcohol use: No     Comment: sometimes       ROS:   Review of systems negative except as stated above.    OBJECTIVE:  /80   Pulse 68   Temp 97.4  F (36.3  C)   Wt 84.8 kg (187 lb)   SpO2 97%   BMI 32.10 kg/m    GENERAL APPEARANCE: healthy, alert and no distress  ABDOMEN:  soft, nontender, no HSM or masses and bowel sounds normal  BACK: No CVA tenderness  PSYCH: mentation appears normal    Chelo Salazar MD

## 2024-04-21 LAB
C TRACH DNA SPEC QL PROBE+SIG AMP: NEGATIVE
N GONORRHOEA DNA SPEC QL NAA+PROBE: NEGATIVE

## 2024-04-22 LAB — BACTERIA UR CULT: NORMAL

## 2024-06-02 ENCOUNTER — HEALTH MAINTENANCE LETTER (OUTPATIENT)
Age: 34
End: 2024-06-02

## 2024-12-13 ENCOUNTER — HOSPITAL ENCOUNTER (EMERGENCY)
Facility: CLINIC | Age: 34
Discharge: LEFT BEFORE DISCHARGE | End: 2024-12-13
Attending: EMERGENCY MEDICINE | Admitting: EMERGENCY MEDICINE
Payer: COMMERCIAL

## 2024-12-13 VITALS
RESPIRATION RATE: 20 BRPM | TEMPERATURE: 97.6 F | HEIGHT: 64 IN | DIASTOLIC BLOOD PRESSURE: 81 MMHG | OXYGEN SATURATION: 97 % | SYSTOLIC BLOOD PRESSURE: 126 MMHG | WEIGHT: 174.82 LBS | HEART RATE: 94 BPM | BODY MASS INDEX: 29.85 KG/M2

## 2024-12-13 DIAGNOSIS — Z53.21 ELOPED FROM EMERGENCY DEPARTMENT: ICD-10-CM

## 2024-12-13 PROCEDURE — 99281 EMR DPT VST MAYX REQ PHY/QHP: CPT

## 2024-12-13 ASSESSMENT — ACTIVITIES OF DAILY LIVING (ADL): ADLS_ACUITY_SCORE: 46

## 2024-12-13 ASSESSMENT — COLUMBIA-SUICIDE SEVERITY RATING SCALE - C-SSRS
1. IN THE PAST MONTH, HAVE YOU WISHED YOU WERE DEAD OR WISHED YOU COULD GO TO SLEEP AND NOT WAKE UP?: NO
6. HAVE YOU EVER DONE ANYTHING, STARTED TO DO ANYTHING, OR PREPARED TO DO ANYTHING TO END YOUR LIFE?: NO
2. HAVE YOU ACTUALLY HAD ANY THOUGHTS OF KILLING YOURSELF IN THE PAST MONTH?: NO

## 2024-12-14 NOTE — ED TRIAGE NOTES
Pt took her medication 45 min ago and since that time pt has felt sick, heart racing. Pt reports she doesn't take Citalopram 40mg and Lamotrigine 125mg every day like she is supposed too. She maybe takes them every 3 days. She thinks the dose is now too much for her

## 2025-06-13 ENCOUNTER — APPOINTMENT (OUTPATIENT)
Dept: GENERAL RADIOLOGY | Facility: CLINIC | Age: 35
End: 2025-06-13
Attending: EMERGENCY MEDICINE

## 2025-06-13 ENCOUNTER — APPOINTMENT (OUTPATIENT)
Dept: CT IMAGING | Facility: CLINIC | Age: 35
End: 2025-06-13
Attending: EMERGENCY MEDICINE

## 2025-06-13 ENCOUNTER — HOSPITAL ENCOUNTER (EMERGENCY)
Facility: CLINIC | Age: 35
Discharge: HOME | End: 2025-06-13
Attending: EMERGENCY MEDICINE | Admitting: EMERGENCY MEDICINE

## 2025-06-13 VITALS
HEIGHT: 65 IN | HEART RATE: 81 BPM | BODY MASS INDEX: 28.32 KG/M2 | WEIGHT: 170 LBS | SYSTOLIC BLOOD PRESSURE: 138 MMHG | RESPIRATION RATE: 16 BRPM | TEMPERATURE: 98.5 F | DIASTOLIC BLOOD PRESSURE: 89 MMHG | OXYGEN SATURATION: 100 %

## 2025-06-13 DIAGNOSIS — S60.222A CONTUSION OF LEFT HAND, INITIAL ENCOUNTER: ICD-10-CM
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
## 2025-06-13 DIAGNOSIS — S06.0X0A CONCUSSION WITHOUT LOSS OF CONSCIOUSNESS, INITIAL ENCOUNTER: ICD-10-CM

## 2025-06-13 DIAGNOSIS — S16.1XXA CERVICAL STRAIN, INITIAL ENCOUNTER: ICD-10-CM

## 2025-06-13 DIAGNOSIS — S20.212A CHEST WALL CONTUSION, LEFT, INITIAL ENCOUNTER: ICD-10-CM

## 2025-06-13 PROCEDURE — 71046 X-RAY EXAM CHEST 2 VIEWS: CPT

## 2025-06-13 PROCEDURE — 250N000013 HC RX MED GY IP 250 OP 250 PS 637: Performed by: EMERGENCY MEDICINE

## 2025-06-13 PROCEDURE — 73130 X-RAY EXAM OF HAND: CPT | Mod: LT

## 2025-06-13 PROCEDURE — 99285 EMERGENCY DEPT VISIT HI MDM: CPT | Mod: 25

## 2025-06-13 PROCEDURE — 70450 CT HEAD/BRAIN W/O DYE: CPT

## 2025-06-13 RX ORDER — IBUPROFEN 600 MG/1
600 TABLET, FILM COATED ORAL ONCE
Status: COMPLETED | OUTPATIENT
Start: 2025-06-13 | End: 2025-06-13

## 2025-06-13 RX ADMIN — IBUPROFEN 600 MG: 600 TABLET ORAL at 19:03

## 2025-06-13 ASSESSMENT — ACTIVITIES OF DAILY LIVING (ADL)
ADLS_ACUITY_SCORE: 46
ADLS_ACUITY_SCORE: 46
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
## 2025-06-15 ENCOUNTER — HEALTH MAINTENANCE LETTER (OUTPATIENT)
Age: 35
End: 2025-06-15

## 2025-06-16 LAB
ATRIAL RATE - MUSE: 69 BPM
DIASTOLIC BLOOD PRESSURE - MUSE: NORMAL MMHG
INTERPRETATION ECG - MUSE: NORMAL
P AXIS - MUSE: 66 DEGREES
PR INTERVAL - MUSE: 172 MS
QRS DURATION - MUSE: 92 MS
QT - MUSE: 354 MS
QTC - MUSE: 379 MS
R AXIS - MUSE: 58 DEGREES
SYSTOLIC BLOOD PRESSURE - MUSE: NORMAL MMHG
T AXIS - MUSE: 65 DEGREES
VENTRICULAR RATE- MUSE: 69 BPM

## (undated) DEVICE — LINEN HALF SHEET 5512

## (undated) DEVICE — GLOVE PROTEXIS W/NEU-THERA 7.5  2D73TE75

## (undated) DEVICE — PACK GYN LAPAROSCOPY RIDGES

## (undated) DEVICE — ENDO CANNULA 05MM VERSAONE UNIVERSAL UNVCA5STF

## (undated) DEVICE — DRAPE MAYO STAND 23X54 8337

## (undated) DEVICE — ESU LIGASURE LAPAROSCOPIC BLUNT TIP SEALER 5MMX37CM LF1837

## (undated) DEVICE — ENDO TROCAR OPTICAL 05MM VERSAPORT PLUS W/FIX CAN ONB5STF

## (undated) DEVICE — ESU CORD MONOPOLAR 10'  E0510

## (undated) DEVICE — PAD CHUX UNDERPAD 30X36" P3036C

## (undated) DEVICE — LINEN TOWEL PACK X10 5473

## (undated) DEVICE — SU MONOCRYL 4-0 PS-2 27" UND Y426H

## (undated) DEVICE — ESU HANDPIECE BIPOLAR THUNDERBEAT FC 5MMX35CM TB-0535FC

## (undated) DEVICE — LINEN FULL SHEET 5511

## (undated) DEVICE — SOL NACL 0.9% IRRIG 1000ML BOTTLE 2F7124

## (undated) DEVICE — GLOVE PROTEXIS POWDER FREE SMT 6.5  2D72PT65X

## (undated) DEVICE — GLOVE PROTEXIS BLUE W/NEU-THERA 7.0  2D73EB70

## (undated) DEVICE — ESU GROUND PAD ADULT W/CORD E7507

## (undated) DEVICE — NDL INSUFFLATION 13GA 120MM C2201

## (undated) DEVICE — NDL INSUFFLATION 14GA STEP S100000

## (undated) DEVICE — LINEN DRAPE 54X72" 5467

## (undated) DEVICE — ADH SKIN CLOSURE PREMIERPRO EXOFIN MICOR HV 0.5ML 3471

## (undated) DEVICE — BAG CLEAR TRASH 1.3M 39X33" P4040C

## (undated) DEVICE — DRSG STERI STRIP 1/2X4" R1547

## (undated) DEVICE — ENDO TROCAR SLEEVE KII ADV FIXATION 05X100MM CFS02

## (undated) DEVICE — LINEN POUCH DBL 5427

## (undated) DEVICE — CATH INTERMITTENT CLEAN-CATH FEMALE 14FR 6" VINYL LF 420614

## (undated) DEVICE — CATH TRAY FOLEY SURESTEP 16FR DRAIN BAG STATOCK A899916

## (undated) DEVICE — ENDO TROCAR FIRST ENTRY KII FIOS ADV FIX 05X100MM CFF03

## (undated) RX ORDER — FENTANYL CITRATE 50 UG/ML
INJECTION, SOLUTION INTRAMUSCULAR; INTRAVENOUS
Status: DISPENSED
Start: 2017-11-21

## (undated) RX ORDER — GLYCOPYRROLATE 0.2 MG/ML
INJECTION INTRAMUSCULAR; INTRAVENOUS
Status: DISPENSED
Start: 2017-11-21

## (undated) RX ORDER — KETOROLAC TROMETHAMINE 30 MG/ML
INJECTION, SOLUTION INTRAMUSCULAR; INTRAVENOUS
Status: DISPENSED
Start: 2017-11-21

## (undated) RX ORDER — LIDOCAINE HYDROCHLORIDE 10 MG/ML
INJECTION, SOLUTION EPIDURAL; INFILTRATION; INTRACAUDAL; PERINEURAL
Status: DISPENSED
Start: 2017-11-21

## (undated) RX ORDER — ACETAMINOPHEN 325 MG/1
TABLET ORAL
Status: DISPENSED
Start: 2019-06-11

## (undated) RX ORDER — PROPOFOL 10 MG/ML
INJECTION, EMULSION INTRAVENOUS
Status: DISPENSED
Start: 2017-11-21

## (undated) RX ORDER — ACETAMINOPHEN 500 MG
TABLET ORAL
Status: DISPENSED
Start: 2017-11-21

## (undated) RX ORDER — ONDANSETRON 2 MG/ML
INJECTION INTRAMUSCULAR; INTRAVENOUS
Status: DISPENSED
Start: 2019-06-11

## (undated) RX ORDER — DEXAMETHASONE SODIUM PHOSPHATE 4 MG/ML
INJECTION, SOLUTION INTRA-ARTICULAR; INTRALESIONAL; INTRAMUSCULAR; INTRAVENOUS; SOFT TISSUE
Status: DISPENSED
Start: 2017-11-21

## (undated) RX ORDER — ONDANSETRON 2 MG/ML
INJECTION INTRAMUSCULAR; INTRAVENOUS
Status: DISPENSED
Start: 2017-11-21

## (undated) RX ORDER — GLYCOPYRROLATE 0.2 MG/ML
INJECTION INTRAMUSCULAR; INTRAVENOUS
Status: DISPENSED
Start: 2019-06-11

## (undated) RX ORDER — NEOSTIGMINE METHYLSULFATE 1 MG/ML
VIAL (ML) INJECTION
Status: DISPENSED
Start: 2019-06-11

## (undated) RX ORDER — FENTANYL CITRATE 50 UG/ML
INJECTION, SOLUTION INTRAMUSCULAR; INTRAVENOUS
Status: DISPENSED
Start: 2019-06-11

## (undated) RX ORDER — BUPIVACAINE HYDROCHLORIDE AND EPINEPHRINE 5; 5 MG/ML; UG/ML
INJECTION, SOLUTION EPIDURAL; INTRACAUDAL; PERINEURAL
Status: DISPENSED
Start: 2019-06-11

## (undated) RX ORDER — PROPOFOL 10 MG/ML
INJECTION, EMULSION INTRAVENOUS
Status: DISPENSED
Start: 2019-06-11

## (undated) RX ORDER — LIDOCAINE HYDROCHLORIDE 10 MG/ML
INJECTION, SOLUTION EPIDURAL; INFILTRATION; INTRACAUDAL; PERINEURAL
Status: DISPENSED
Start: 2019-06-11

## (undated) RX ORDER — NEOSTIGMINE METHYLSULFATE 1 MG/ML
VIAL (ML) INJECTION
Status: DISPENSED
Start: 2017-11-21

## (undated) RX ORDER — OXYCODONE HYDROCHLORIDE 5 MG/1
TABLET ORAL
Status: DISPENSED
Start: 2017-11-21

## (undated) RX ORDER — DEXAMETHASONE SODIUM PHOSPHATE 4 MG/ML
INJECTION, SOLUTION INTRA-ARTICULAR; INTRALESIONAL; INTRAMUSCULAR; INTRAVENOUS; SOFT TISSUE
Status: DISPENSED
Start: 2019-06-11